# Patient Record
Sex: FEMALE | Race: BLACK OR AFRICAN AMERICAN | Employment: UNEMPLOYED | URBAN - METROPOLITAN AREA
[De-identification: names, ages, dates, MRNs, and addresses within clinical notes are randomized per-mention and may not be internally consistent; named-entity substitution may affect disease eponyms.]

---

## 2018-02-01 ENCOUNTER — OFFICE VISIT (OUTPATIENT)
Dept: OBGYN | Facility: CLINIC | Age: 38
End: 2018-02-01
Payer: COMMERCIAL

## 2018-02-01 VITALS — TEMPERATURE: 96.8 F | SYSTOLIC BLOOD PRESSURE: 110 MMHG | DIASTOLIC BLOOD PRESSURE: 78 MMHG | WEIGHT: 203 LBS

## 2018-02-01 DIAGNOSIS — R10.13 ABDOMINAL PAIN, EPIGASTRIC: Primary | ICD-10-CM

## 2018-02-01 DIAGNOSIS — K59.09 OTHER CONSTIPATION: ICD-10-CM

## 2018-02-01 DIAGNOSIS — Z30.011 ENCOUNTER FOR INITIAL PRESCRIPTION OF CONTRACEPTIVE PILLS: ICD-10-CM

## 2018-02-01 PROCEDURE — 99203 OFFICE O/P NEW LOW 30 MIN: CPT | Performed by: OBSTETRICS & GYNECOLOGY

## 2018-02-01 RX ORDER — ACETAMINOPHEN AND CODEINE PHOSPHATE 120; 12 MG/5ML; MG/5ML
1 SOLUTION ORAL DAILY
Qty: 112 TABLET | Refills: 3 | Status: SHIPPED | OUTPATIENT
Start: 2018-02-01

## 2018-02-01 RX ORDER — ACETAMINOPHEN 500 MG
1000 TABLET ORAL EVERY 8 HOURS PRN
Qty: 100 TABLET | Refills: 1 | Status: SHIPPED | OUTPATIENT
Start: 2018-02-01

## 2018-02-01 RX ORDER — POLYETHYLENE GLYCOL 3350 17 G/17G
1 POWDER, FOR SOLUTION ORAL 2 TIMES DAILY
Qty: 510 G | Refills: 3 | Status: SHIPPED | OUTPATIENT
Start: 2018-02-01

## 2018-02-01 NOTE — PROGRESS NOTES
GYN Clinic Visit    Date of visit: 2018     Chief Complaint: abdominal pain    HPI:   Lolly Jaeger is a 38 year old  for evaluation of abdominal pain. She complains of abdominal pain of 1 week duration.    Location: umbilical, mid abdomen, feels like there might be a bulge to the right of her umbilicus  Quality: sharp and stabbing  Severity:  8/10  Duration: 1 week  Timing: started  at 11am  Context: vaginal bleeding started  at 5pm. Was given norethindrone 10mg to take daily on . Bleeding stopped 2 days ago () but still having pain.  Associated signs/symptoms: no n/v but patient is having constipation for a long. Has bowel movement 1-2 times per week. Has rx for stool softener, takes it 2 times daily but does not seem to help.   Modifying factors: worse with coughing, needs to hold abdomen with coughing    Went to ED at [facility]  with following work up:  18 LIPASE 25.3   18 TSH 0.90   18 PREGURINE Negative     18 Urinalysis  COLOR Pink A   CLARITY Cloudy A   SPECGRAV 1.010   PHURINE 6.0   UROBILINOGEN Normal   PROTEINUA Trace A   GLUCOSEUA Negative   KETONESUA Negative   BLOODUA Moderate A   NITRITE Negative   LEUKOCYTE Small A     MICRO:   RBCUA >100 A   WBCUA 11-25 A   BACTERIAUA Few   EPITHELIALUA Few     CT Abdomen with IV Contrast:   INDICATION: Epigastric pain, vaginal bleeding.  TECHNIQUE: CT Abdomen and pelvis with i.v. contrast. Coronal and sagittal reformats were obtained.  CONTRAST: 100 mL Omnipaque 350  COMPARISON: None  FINDINGS:   Lower chest: Unremarkable.   Liver: Unremarkable.   Spleen: Unremarkable.   Pancreas: Unremarkable.   Gallbladder: Unremarkable.    Kidney: Unremarkable. No kidney or ureteral stones or obstruction seen.   Adrenal: Unremarkable.   Bowel: There are innumerable small nodular densities seen within the lumen of the stomach as well as the proximal small bowel, likely due to recent ingested meal. The appendix is  not identified. Eventration of the midline abdominal wall is present with diastases of the rectus abdominus muscles with a tiny fat containing ventral hernia.     Vascular: Unremarkable.    Lymph: Unremarkable.    Peritoneum: Unremarkable. No pneumoperitoneum is seen. No significant ascites is noted.    Pelvis: Unremarkable.   Soft tissue: Unremarkable. Diffuse fatty atrophy of the left pelvic girdle muscles are noted.    Bone: Unremarkable for age.       IMPRESSION:   1. Unremarkable with no CT correlate for the patient`s symptoms seen.    Addendum by Richy Keenan MD on 2018  3:51 AM  INDICATION: Pelvic pain  TECHNIQUE: Ultrasound pelvis transabdominal and transvaginal. Endovaginal   imaging was performed to better visualize the endometrium and ovaries.   Real-time gray scale sonographic images with spectral and color Doppler   imaging of the ovaries were obtained.    COMPARISON: None    FINDINGS:   Uterus: 10 x 3.2 x 3.7 cm. Normal echotexture of the myometrium noted with   no masses are seen.     Endometrium: 3 mm. No sign of endometrial mass or fluid present.     Right ovary: 1.8 x 1.6 x 1 cm. No Doppler images of the right ovary were   submitted.     Left ovary: 2.5 x 0.8 x 1.7 cm. No Doppler images of the left ovary were   submitted.     Cul-de-sac: No significant ascites noted.     IMPRESSION:   1. Unremarkable pelvic ultrasound. Doppler imaging the ovaries were not   performed but the ovaries are normal in size and appearance on   transabdominal imaging.  Dictated by: ,MD @ 2018 03:49:54    Obstetric History:   Obstetric History     No data available        Obstetric history significant for:   CS x1   x1  Both girls  - one at age 3 (parapalegic[place], one at 1month of age of diarrhea in [place]    Gynecologic History:  Menses: 1-2 times per month - 4 days in duration.   Patient's last menstrual period was 2018.  STI history: none  Last Pap: does  not know  History of abnormal pap: does not know  History of cervical procedures: no   Contraceptive History: none  The patient is sexually active with      Past Medical History:  Past Medical History:   Diagnosis Date     Pain in left foot 7/2014 7/19/2014 cuneiform fracture     Polio 1982    left leg weakness       Past Surgical History:  Past Surgical History:   Procedure Laterality Date     NO HISTORY OF SURGERY         Medications:  Current Outpatient Prescriptions   Medication     diclofenac (VOLTAREN) 1 % GEL     FLUOXETINE HCL PO     LEVOTHYROXINE SODIUM PO     Cholecalciferol (VITAMIN D) 1000 UNITS capsule     OMEPRAZOLE PO     DICLOFENAC SODIUM PO     TRAZODONE HCL PO     acetaminophen (TYLENOL) 325 MG tablet     No current facility-administered medications for this visit.        Allergy:  No Known Allergies  Patient denies food, latex or environmental allergies.     Social History:  Lives with . Feels safe.   Social History   Substance Use Topics     Smoking status: Never Smoker     Smokeless tobacco: Never Used     Alcohol use No       No family history on file.    Review of Systems:  Skin: negative  Eyes: negative  Ears/Nose/Throat: negative  Respiratory: No shortness of breath, dyspnea on exertion, cough, or hemoptysis  Cardiovascular: negative  Gastrointestinal: positive for constipation and abdominal pain. No nausea, no vomiting  Genitourinary: recent episode of heavy bleeding  Musculoskeletal: h/o polio  Neurologic: negative  Psychiatric: negative  Hematologic/Lymphatic/Immunologic: negative  Endocrine: negative    Physical Exam:  Vitals:    02/01/18 0936   BP: 110/78   Temp: 96.8  F (36  C)   TempSrc: Oral   Weight: 203 lb (92.1 kg)     Gen: healthy, alert, active, no distress  Abd: soft, non-tender, non-distended, obese, no masses. Only limited palpation of area of tenderness to the right of umbilicus due to patient discomfort. Voluntary guarding. When asked to cough, patient  clutches her right mid abdomen. Well healed vertical midline scar.  Extremities: nontender, no edema  : declined exam    Assessment:  Lolly Jaeger is a 38 year old  who presents in consultation for abdominal pain and abnormal uterine bleeding.   Abdominal pain does not seem to be gynecologic in origin. Discussed that constipation may exacerbate pain but that has been a longstanding issue and onset of pain was acute 1 week ago. Discussed possible ventral hernia but patient only allowed limited exam. Recommend following up with family practice, consider gen surg consult for possible hernia. Recommend return to ED if severe pain, nausea/vomiting, fever.     In regard to her episode of heavy bleeding, it has resolved with oral progesterone. She is interested in contraception. Due to her age and obesity, recommend progesterone only method. Rx for micronor written, instructed to start it when other norethindrone rx runs out.     Plan:  1. Abdominal pain, epigastric  Discussed that she may have a hernia based on history and outside CT scan. She is difficult to examine and will not allow me to palpate the area of tenderness. She holds it tightly when coughing.    - GENERAL SURG ADULT REFERRAL  - acetaminophen (TYLENOL) 500 MG tablet; Take 2 tablets (1,000 mg) by mouth every 8 hours as needed for mild pain  Dispense: 100 tablet; Refill: 1    2. Encounter for initial prescription of contraceptive pills  - norethindrone (MICRONOR) 0.35 MG per tablet; Take 1 tablet (0.35 mg) by mouth daily  Dispense: 112 tablet; Refill: 3    3. Other constipation  - polyethylene glycol (MIRALAX) powder; Take 17 g (1 capful) by mouth 2 times daily  Dispense: 510 g; Refill: 3    Recommend patient return to clinic for pelvic exam and pap smear. She declines exam today.    Renetta Arteaga MD

## 2018-02-01 NOTE — MR AVS SNAPSHOT
After Visit Summary   2/1/2018    Lolly Jaeger    MRN: [id number]           Patient Information     Date Of Birth          1980        Visit Information        Provider Department      2/1/2018 9:15 AM Renetta Arteaga MD; [facility] [employer] [facility] [place]        Today's Diagnoses     Abdominal pain, epigastric    -  1    Encounter for initial prescription of contraceptive pills           Follow-ups after your visit        Additional Services     GENERAL SURG ADULT REFERRAL       Possible ventral hernia  Paraumbilical pain    Your provider has referred you to: [facility]: [facility] [phone]   [email or web address]    Please be aware that coverage of these services is subject to the terms and limitations of your health insurance plan.  Call member services at your health plan with any benefit or coverage questions.      Please bring the following with you to your appointment:    (1) Any X-Rays, CTs or MRIs which have been performed.  Contact the facility where they were done to arrange for  prior to your scheduled appointment.   (2) List of current medications   (3) This referral request   (4) Any documents/labs given to you for this referral                  Who to contact     If you have questions or need follow up information about today's clinic visit or your schedule please contact [facility] directly at [phone].  Normal or non-critical lab and imaging results will be communicated to you by MyChart, letter or phone within 4 business days after the clinic has received the results. If you do not hear from us within 7 days, please contact the clinic through MyChart or phone. If you have a critical or abnormal lab result, we will notify you by phone as soon as possible.  Submit refill requests through [employer] or call your pharmacy and they will forward the refill request to  "us. Please allow 3 business days for your refill to be completed.          Additional Information About Your Visit        [employer] Information     [employer] lets you send messages to your doctor, view your test results, renew your prescriptions, schedule appointments and more. To sign up, go to [email or web address] . Click on \"Log in\" on the left side of the screen, which will take you to the Welcome page. Then click on \"Sign up Now\" on the right side of the page.     You will be asked to enter the access code listed below, as well as some personal information. Please follow the directions to create your username and password.     Your access code is: [email or web address]  Expires: 2018 10:14 AM     Your access code will  in 90 days. If you need help or a new code, please call your [facility] clinic or [phone].        Care EveryWhere ID     This is your Care EveryWhere ID. This could be used by other organizations to access your [facility] medical records  [id number]        Your Vitals Were     Temperature Last Period                96.8  F (36  C) (Oral) 2018           Blood Pressure from Last 3 Encounters:   18 110/78   14 100/70    Weight from Last 3 Encounters:   18 203 lb (92.1 kg)   07/17/15 214 lb (97.1 kg)   14 186 lb (84.4 kg)              We Performed the Following     GENERAL SURG ADULT REFERRAL          Today's Medication Changes          These changes are accurate as of 18 10:14 AM.  If you have any questions, ask your nurse or doctor.               Start taking these medicines.        Dose/Directions    norethindrone 0.35 MG per tablet   Commonly known as:  MICRONOR   Used for:  Encounter for initial prescription of contraceptive pills   Started by:  Renetta Arteaga MD        Dose:  1 tablet   Take 1 tablet (0.35 mg) by mouth daily   Quantity:  112 tablet   Refills:  3            Where to get your medicines      These medications were sent to " [id number] - [place], [place] - [address], [facility] [address]     Phone:  [phone]     norethindrone 0.35 MG per tablet                Primary Care Provider Office Phone #    [facility] [phone] [address] [address]        Equal Access to Services     SHAUNNA ARREDONDO : Hadii aad ku hadasho Soomaali, waaxda luqadaha, qaybta kaalmada adeegyada, waxay idiin hayaan adeeg kingsleymaevevanessa lacrystal . So [facility] [phone].    ATENCIÓN: Si habla español, tiene a contreras disposición servicios gratuitos de asistencia lingüística. Latonya al [phone].    We comply with applicable federal civil rights laws and [place] laws. We do not discriminate on the basis of race, color, national origin, age, disability, sex, sexual orientation, or gender identity.            Thank you!     Thank you for choosing [facility]  for your care. Our goal is always to provide you with excellent care. Hearing back from our patients is one way we can continue to improve our services. Please take a few minutes to complete the written survey that you may receive in the mail after your visit with us. Thank you!             Your Updated Medication List - Protect others around you: Learn how to safely use, store and throw away your medicines at www.disposemymeds.org.          This list is accurate as of 2/1/18 10:14 AM.  Always use your most recent med list.                   Brand Name Dispense Instructions for use Diagnosis    acetaminophen 325 MG tablet    TYLENOL    100 tablet    Take 2 tablets (650 mg) by mouth every 4 hours as needed for mild pain        diclofenac 1 % Gel topical gel    VOLTAREN     Place onto the skin 4 times daily        DICLOFENAC SODIUM PO           FLUOXETINE HCL PO           LEVOTHYROXINE SODIUM PO           norethindrone 0.35 MG per tablet    MICRONOR    112 tablet    Take 1 tablet (0.35 mg) by mouth daily    Encounter for  initial prescription of contraceptive pills       OMEPRAZOLE PO           TRAZODONE HCL PO           vitamin D 1000 UNITS capsule      Take 1 capsule by mouth daily

## 2018-02-01 NOTE — NURSING NOTE
Chief Complaint   Patient presents with     Consult       Initial /78  Temp 96.8  F (36  C) (Oral)  Wt 203 lb (92.1 kg)  LMP 01/27/2018 There is no height or weight on file to calculate BMI.  BP completed using cuff size: large    No obstetric history on file.    The following HM Due: NONE      The following patient reported/Care Every where data was sent to:  P ABSTRACT QUALITY INITIATIVES [42630]        N/a      Evi Sarah MA

## 2019-06-13 ENCOUNTER — OFFICE VISIT (OUTPATIENT)
Dept: NEUROLOGY | Facility: CLINIC | Age: 39
End: 2019-06-13
Payer: COMMERCIAL

## 2019-06-13 VITALS
SYSTOLIC BLOOD PRESSURE: 114 MMHG | WEIGHT: 180.1 LBS | DIASTOLIC BLOOD PRESSURE: 79 MMHG | OXYGEN SATURATION: 100 % | HEART RATE: 82 BPM

## 2019-06-13 DIAGNOSIS — R20.2 PARESTHESIA: Primary | ICD-10-CM

## 2019-06-13 DIAGNOSIS — R20.2 PARESTHESIA: ICD-10-CM

## 2019-06-13 DIAGNOSIS — Z86.12 HISTORY OF POST-POLIO SYNDROME: ICD-10-CM

## 2019-06-13 LAB
HBA1C MFR BLD: 5.8 % (ref 0–5.6)
VIT B12 SERPL-MCNC: 642 PG/ML (ref 193–986)

## 2019-06-13 ASSESSMENT — PAIN SCALES - GENERAL: PAINLEVEL: NO PAIN (0)

## 2019-06-13 NOTE — LETTER
Lolly CHAUDHRY Mil  [address]   [place] [address]        6/18/2019      Dear Ms. Jaeger:    Thank you for allowing me to participate in your care. Your recent test results were reviewed and listed below.      Recommended to follow up with your primary care provider for elevated A1c which indicates that your blood sugar was not well controlled.       Your results are provided below for your review       Results for orders placed or performed in visit on 06/13/19   Hemoglobin A1c   Result Value Ref Range    Hemoglobin A1C 5.8 (H) 0 - 5.6 %   Vitamin B12   Result Value Ref Range    Vitamin B12 642 193 - 986 pg/mL          If you have any further questions or concerns, please do not hesitate to contact us.     Sincerely,    AMBER Mustafa [facility]  NEUROLOGY CLINIC  Phone: [phone]

## 2019-06-13 NOTE — PROGRESS NOTES
"Re: Lolly Jaeger      MRN# [id number]  YOB: 1980  Date of Visit:6/13/2019    OUTPATIENT NEUROLOGY VISIT NOTE    Chief Complaint:   paresthesia in hands and feet    History of Present Illness  Lolly Jaeger is a 39-year-old right-handed  presents to the clinic today for paresthesia in hands and feet  Accompanied to today's appointment by a [facility] .   Reports history poliomyelitis when she was 1 years old and since then she has been experiencing weakness in the left leg.     New symptoms- Right shoulder sharp pain and \"needle like pain\" in both hands and in both feet. Onset 5 months ago. Symptoms in her hands and feet are worse at night, especially right arm pain.   Reports that left leg feels a \"little bit\" weak. Reports that she fells because of left leg weakness because of \"polio\". Patient uses a walker with walking.   Reports that she has been fallen but denies injury to head or neck. Reports that her last fall was 5 months ago and injured her knee and fell on her hands. Patient points that the pain is predominately in the right deltoid area.   Ortho visit on 9/26/2018 for knee pain and steroid injection and right shoulder pain due to injury on 8/22/2018 (at the same time with her right knee) fellon the right shoulder and knee. Limited ROM in the right shoulder and symptoms were consistent with bursitis and received injections but did not do PT. Patient reports that pain in her right shoulder was relieved for 3-months after the injections. Patient did not return back to her orthopedist.     Denies history of head or neck trauma, loss of consciousness, seizure, double vision, blurred vision, hearing difficulty, speech or swallowing difficulty, weakness or numbness in face, arms or legs, urinary or bowel incontinence, coordination problems or gait difficulty, fever or chills.    Neurodiagnostic Testing:    MRI reviewed  IMPRESSION:    1.  Moderate supraspinatus tendinopathy with " articular and bursal sided fraying. No evidence of a tear.  2.  Moderate infraspinatus tendinopathy without evidence of a tear.  3.  Moderate volume subacromial/deltoid bursal fluid which may represent bursitis.  4.  Mild glenohumeral chondromalacia.  5.  Small glenohumeral joint effusion.  6.  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  Lab  TSH in 2018 0.90  CBC 2018  Normal    Past Medical History reviewed and verified with the patient  Diagnosis Date     Closed multiple fractures of both legs     Falls frequently     Knee pain, right     Late effects of poliomyelitis     Left leg weakness     Shoulder pain, right       Past Medical History:   Diagnosis Date     Pain in left foot 2014 cuneiform fracture     Polio 1982    left leg weakness       Past Surgical History reviewed and verified with the patient  Past Surgical History:   Procedure Laterality Date      SECTION       Family History reviewed and verified with the patient  No neurological problems    Social History:  Reports that she had 2 kids - at the age of 2 months and 5-year old, lives with her    Social History     Tobacco Use     Smoking status: Never Smoker     Smokeless tobacco: Never Used   Substance Use Topics     Alcohol use: No    reviewed and verified with the patient   No Known Allergies    Current Outpatient Medications   Medication Sig Dispense Refill     acetaminophen (TYLENOL) 325 MG tablet Take 2 tablets (650 mg) by mouth every 4 hours as needed for mild pain 100 tablet 0     acetaminophen (TYLENOL) 500 MG tablet Take 2 tablets (1,000 mg) by mouth every 8 hours as needed for mild pain 100 tablet 1     Cholecalciferol (VITAMIN D) 1000 UNITS capsule Take 1 capsule by mouth daily       diclofenac (VOLTAREN) 1 % GEL Place onto the skin 4 times daily       DICLOFENAC SODIUM PO        FLUOXETINE HCL PO        LEVOTHYROXINE SODIUM PO        norethindrone (MICRONOR) 0.35 MG per tablet Take 1 tablet (0.35  mg) by mouth daily 112 tablet 3     OMEPRAZOLE PO        polyethylene glycol (MIRALAX) powder Take 17 g (1 capful) by mouth 2 times daily 510 g 3     TRAZODONE HCL PO      reviewed and verified with the patient    Review of Systems:  A 12-point ROS including constitutional, eyes, ENT, respiratory, cardiovascular, gastroenterology, genitourinary, integumentary, musculoskeletal, neurology, hematology and psychiatric were all reviewed with the patient and completed at the Neuroscience Services Question nary and as mentioned in the HPI.     General Exam:   /79 (BP Location: Left arm, Patient Position: Sitting, Cuff Size: Adult Large)   Pulse 82   Wt 81.7 kg (180 lb 1.6 oz)   SpO2 100%   GEN: Awake, NAD; good eye contact, responses appropriately via   but sits quietly and flat affect  HEENT: Head atraumatic/Normocephalic. Scalp normal. Pupils equally round, 4 mm, reactive to light and accommodation, sclera and conjunctiva normal. Fundoscopic examination reveals normal vessels no papilledema.   Neck: Easily moveable without resistance  Heart: S1/S2 appreciated, RRR, no m/r/g, no carotid bruits  Lungs:Lungs are clear to auscultation bilaterally, no wheezes or crackles.   Neurological Examination:  The patient is alert and oriented times four. Has good attention and concentration.  Speech is fluent without dysarthria.   Cranial nerves:  CN I deferred.   CN II: Intact and full visual fields to confrontation bilaterally. CN III, IV, VI: EOM intact. There is no nystagmus. Has conjugated gaze. Intact direct and consensual pupillary light reflexes.   CN V: Intact and symmetrical to facial sensation in the V1 through V3 bilaterally.   CN VII: Intact and symmetrical eyebrow and lid raise and eyelid closure, smiles and frown.   CN VIII: Intact to finger rub bilaterally.   CN IX and X: The palates elevates symmetrical. The uvula is midline.   CN XII: The tongue protrudes midline with no atrophy or fasciculations.    Motor exam: The patient has a normal bulk and tone throughout, except hypotonia in the left ankle.   There is atrophy left foot post polio  Strength Exam:  4/5 strength left and antigravity right  at shoulder abduction, elbow flexion or extension, wrist flexion or extension, finger abduction,  weaker right than left , antigravity right hip flexion and extension, knee flexion and extension, and dorsiflexion and plantarflexion and left leg weakness post polio.   Sensation is intact to light touch and pinprick throughout. Intact vibration at great toes bilaterally.   Reflexes are 2+ and symmetrical at biceps and brachioradialis and 1/4 triceps right and 1+ left , 1/4 right and not able to illicit left patellar, and 1+ right and not able to illicit left Achilles.   Negative Babinski with downgoing toes bilaterally.   Gait slow uses walker      Assessment and Plan:  New onset of paresthesia in both hands and feet. Weakness in the upper and lower extremities -left leg post polio and UE and right leg -possibly due to pain (right knee pain and right shoulder pain). No sensory deficit today  Patient reports that she falls frequently so its not unreasonable to image her cervical area given weakness and paresthesia.   EMG for paresthesia localization    Plan:  Cervical MRI   EMG   Labs: A1c, B12 and MMA  Follow up after tests    I discussed all my recommendation with Lolly Jaeger. The patient verbalizes understanding and comfortable with the plan. The patient has our clinic phone number to call with any questions or concerns. All of the patient's questions were answered from the best of my current knowledge.     Thank you for letting me be a part of the treatment team for Lolly Jaeger      Time spent with pt answering questions, discussing findings, counseling and coordinating care was more than 50% the appointment time, 56  minutes.         AMBER Mustafa, CNP  [facility]

## 2019-06-13 NOTE — LETTER
"6/13/2019       RE: Lolly aJeger  [address]  [facility] [address]     Dear Colleague,    Thank you for referring your patient, Lolly Jaeger, to the [facility] NEUROLOGY at [facility]. Please see a copy of my visit note below.    Re: Lolly Jaeger      MRN# [id number]  YOB: 1980  Date of Visit:6/13/2019    OUTPATIENT NEUROLOGY VISIT NOTE    Chief Complaint:   paresthesia in hands and feet    History of Present Illness  Lolly Jaeger is a 39-year-old right-handed  presents to the clinic today for paresthesia in hands and feet  Accompanied to today's appointment by a [facility] .   Reports history poliomyelitis when she was 1 years old and since then she has been experiencing weakness in the left leg.     New symptoms- Right shoulder sharp pain and \"needle like pain\" in both hands and in both feet. Onset 5 months ago. Symptoms in her hands and feet are worse at night, especially right arm pain.   Reports that left leg feels a \"little bit\" weak. Reports that she fells because of left leg weakness because of \"polio\". Patient uses a walker with walking.   Reports that she has been fallen but denies injury to head or neck. Reports that her last fall was 5 months ago and injured her knee and fell on her hands. Patient points that the pain is predominately in the right deltoid area.   Ortho visit on 9/26/2018 for knee pain and steroid injection and right shoulder pain due to injury on 8/22/2018 (at the same time with her right knee) fellon the right shoulder and knee. Limited ROM in the right shoulder and symptoms were consistent with bursitis and received injections but did not do PT. Patient reports that pain in her right shoulder was relieved for 3-months after the injections. Patient did not return back to her orthopedist.     Denies history of head or neck trauma, loss of consciousness, seizure, double vision, blurred " vision, hearing difficulty, speech or swallowing difficulty, weakness or numbness in face, arms or legs, urinary or bowel incontinence, coordination problems or gait difficulty, fever or chills.    Neurodiagnostic Testing:    MRI reviewed  IMPRESSION:    1.  Moderate supraspinatus tendinopathy with articular and bursal sided fraying. No evidence of a tear.  2.  Moderate infraspinatus tendinopathy without evidence of a tear.  3.  Moderate volume subacromial/deltoid bursal fluid which may represent bursitis.  4.  Mild glenohumeral chondromalacia.  5.  Small glenohumeral joint effusion.  6.  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  Lab  TSH in 2018 0.90  CBC 2018  Normal    Past Medical History reviewed and verified with the patient  Diagnosis Date     Closed multiple fractures of both legs     Falls frequently     Knee pain, right     Late effects of poliomyelitis     Left leg weakness     Shoulder pain, right       Past Medical History:   Diagnosis Date     Pain in left foot 2014 cuneiform fracture     Polio 1982    left leg weakness       Past Surgical History reviewed and verified with the patient  Past Surgical History:   Procedure Laterality Date      SECTION       Family History reviewed and verified with the patient  No neurological problems    Social History:  Reports that she had 2 kids - at the age of 2 months and 5-year old, lives with her    Social History     Tobacco Use     Smoking status: Never Smoker     Smokeless tobacco: Never Used   Substance Use Topics     Alcohol use: No    reviewed and verified with the patient   No Known Allergies    Current Outpatient Medications   Medication Sig Dispense Refill     acetaminophen (TYLENOL) 325 MG tablet Take 2 tablets (650 mg) by mouth every 4 hours as needed for mild pain 100 tablet 0     acetaminophen (TYLENOL) 500 MG tablet Take 2 tablets (1,000 mg) by mouth every 8 hours as needed for mild pain 100 tablet 1      Cholecalciferol (VITAMIN D) 1000 UNITS capsule Take 1 capsule by mouth daily       diclofenac (VOLTAREN) 1 % GEL Place onto the skin 4 times daily       DICLOFENAC SODIUM PO        FLUOXETINE HCL PO        LEVOTHYROXINE SODIUM PO        norethindrone (MICRONOR) 0.35 MG per tablet Take 1 tablet (0.35 mg) by mouth daily 112 tablet 3     OMEPRAZOLE PO        polyethylene glycol (MIRALAX) powder Take 17 g (1 capful) by mouth 2 times daily 510 g 3     TRAZODONE HCL PO      reviewed and verified with the patient    Review of Systems:  A 12-point ROS including constitutional, eyes, ENT, respiratory, cardiovascular, gastroenterology, genitourinary, integumentary, musculoskeletal, neurology, hematology and psychiatric were all reviewed with the patient and completed at the Neuroscience Services Question meche and as mentioned in the HPI.     General Exam:   /79 (BP Location: Left arm, Patient Position: Sitting, Cuff Size: Adult Large)   Pulse 82   Wt 81.7 kg (180 lb 1.6 oz)   SpO2 100%   GEN: Awake, NAD; good eye contact, responses appropriately via   but sits quietly and flat affect  HEENT: Head atraumatic/Normocephalic. Scalp normal. Pupils equally round, 4 mm, reactive to light and accommodation, sclera and conjunctiva normal. Fundoscopic examination reveals normal vessels no papilledema.   Neck: Easily moveable without resistance  Heart: S1/S2 appreciated, RRR, no m/r/g, no carotid bruits  Lungs:Lungs are clear to auscultation bilaterally, no wheezes or crackles.   Neurological Examination:  The patient is alert and oriented times four. Has good attention and concentration.  Speech is fluent without dysarthria.   Cranial nerves:  CN I deferred.   CN II: Intact and full visual fields to confrontation bilaterally. CN III, IV, VI: EOM intact. There is no nystagmus. Has conjugated gaze. Intact direct and consensual pupillary light reflexes.   CN V: Intact and symmetrical to facial sensation in the V1  through V3 bilaterally.   CN VII: Intact and symmetrical eyebrow and lid raise and eyelid closure, smiles and frown.   CN VIII: Intact to finger rub bilaterally.   CN IX and X: The palates elevates symmetrical. The uvula is midline.   CN XII: The tongue protrudes midline with no atrophy or fasciculations.   Motor exam: The patient has a normal bulk and tone throughout, except hypotonia in the left ankle.   There is atrophy left foot post polio  Strength Exam:  4/5 strength left and antigravity right  at shoulder abduction, elbow flexion or extension, wrist flexion or extension, finger abduction,  weaker right than left , antigravity right hip flexion and extension, knee flexion and extension, and dorsiflexion and plantarflexion and left leg weakness post polio.   Sensation is intact to light touch and pinprick throughout. Intact vibration at great toes bilaterally.   Reflexes are 2+ and symmetrical at biceps and brachioradialis and 1/4 triceps right and 1+ left , 1/4 right and not able to illicit left patellar, and 1+ right and not able to illicit left Achilles.   Negative Babinski with downgoing toes bilaterally.   Gait slow uses walker    Assessment and Plan:  New onset of paresthesia in both hands and feet. Weakness in the upper and lower extremities -left leg post polio and UE and right leg -possibly due to pain (right knee pain and right shoulder pain). No sensory deficit today  Patient reports that she falls frequently so its not unreasonable to image her cervical area given weakness and paresthesia.   EMG for paresthesia localization    Plan:  Cervical MRI   EMG   Labs: A1c, B12 and MMA  Follow up after tests    I discussed all my recommendation with Lolly Jaeger. The patient verbalizes understanding and comfortable with the plan. The patient has our clinic phone number to call with any questions or concerns. All of the patient's questions were answered from the best of my current knowledge.      Thank you for letting me be a part of the treatment team for Lolly ISIDORO Jaeger    Time spent with pt answering questions, discussing findings, counseling and coordinating care was more than 50% the appointment time, 56  minutes.     AMBER Mustafa, [facility]

## 2019-06-13 NOTE — NURSING NOTE
Chief Complaint   Patient presents with     Consult     [facility] NEW - PARESTHESIA IN BOTH HANDS AND PAIN IN BOTH FEET       Rolly Nguyen, EMT

## 2019-06-14 LAB — METHYLMALONATE SERPL-SCNC: NORMAL UMOL/L (ref 0–0.4)

## 2019-06-16 LAB
RESULT: NORMAL
SEND OUTS MISC TEST CODE: NORMAL
SEND OUTS MISC TEST SPECIMEN: NORMAL
TEST NAME: NORMAL

## 2019-06-18 NOTE — RESULT ENCOUNTER NOTE
Can you please call the patient with the results and ask her to follow up with her PCP for diabetes evaluation. Please fax it to her PCP. I sent a letter to the patient already.

## 2019-06-19 ENCOUNTER — TELEPHONE (OUTPATIENT)
Dept: NEUROLOGY | Facility: CLINIC | Age: 39
End: 2019-06-19

## 2019-06-19 NOTE — TELEPHONE ENCOUNTER
----- Message from AMBER Velasquez CNP sent at 6/18/2019  4:30 PM CDT -----  Can you please call the patient with the results and ask her to follow up with her PCP for diabetes evaluation. Please fax it to her PCP. I sent a letter to the patient already.

## 2019-07-18 ENCOUNTER — OFFICE VISIT (OUTPATIENT)
Dept: NEUROLOGY | Facility: CLINIC | Age: 39
End: 2019-07-18
Attending: NURSE PRACTITIONER
Payer: COMMERCIAL

## 2019-07-18 DIAGNOSIS — M54.16 RIGHT LUMBAR RADICULOPATHY: ICD-10-CM

## 2019-07-18 DIAGNOSIS — R20.2 PARESTHESIA: ICD-10-CM

## 2019-07-18 DIAGNOSIS — G56.03 BILATERAL CARPAL TUNNEL SYNDROME: Primary | ICD-10-CM

## 2019-07-18 DIAGNOSIS — M54.12 RIGHT CERVICAL RADICULOPATHY: ICD-10-CM

## 2019-07-18 NOTE — LETTER
7/18/2019       RE: Lolly Jaeger  [address]  [facility] [address]     Dear Colleague,    Thank you for referring your patient, Lolly Jaeger, to the [facility] EMG at [facility]. Please see a copy of my visit note below.      [facility]  Electrodiagnostic Laboratory    Nerve Conduction & EMG Report          Patient: Lolly Jaeger  YOB: 1980  Patient ID: [id number]  Age: 39 Years 6 Months  Gender: Female    Referring Provider: Akilah Gustafson NP    History & Examination:    39 year old woman with remote history of polio at age 2 causing left leg weakness, who reports paresthesias at both hands, sharp pain at the right shoulder and proximal arm, and pain at both feet. Query: entrapment neuropathies of upper extremities, bilateral ervical radiculopathies, lumbar radiculopathies, polyneuropathy.     Techniques:    Sensory and motor conduction studies were done with surface recording electrodes. EMG was done with a concentric needle electrode.     Results:    Bilateral median antidromic sensory NCSs showed attenuated conduction velocities, left>right, and normal SNAP amplitudes. Bilateral median orthodromic mixed NCSs done with stimulation at the palm showed prolonged peak latencies bilaterally. Bilateral ulnar orthodromic mixed NCSs, bilateral ulnar, right sural, and right superficial peroneal antidromic sensory NCSs were all normal. Bilateral median motor NCSs showed mildly prolonged distal latencies (right>left), normal CMAP amplitudes, and normal conduction velocities. Bilateral ulnar, right deep peroneal, and right tibial motor NCSs were all normal.   EMG of the right biceps, triceps, deltoid, tibialis anterior, vastus lateralis, and short head of biceps femoris, showed several large, long duration, stable MUPs, with mildly to moderately reduced recruitment in most muscles, except for the right vastus lateralis where  recruitment was deemed normal. No abnormal spontaneous activity was detected except for 1+ fasciculations at the right C7 paraspinals. EMG of the following muscles was normal: bilateral FDI, bilateral pronator teres, left triceps, biceps, and deltoid, left C7 paraspinals, and right medial gastrocnemius. Needle EMG of the right tensor fascia latae was attempted but the muscle could not be reached due to obesity.     Interpretation:    Abnormal study. There is electrodiagnostic evidence of: 1) Bilateral mild-to-moderate median neuropathies at the wrist, as seen in carpal tunnel syndrome, 2) Right chronic C6 and C7, as well as L4, L5, and S1 radiculopathies or anterior horn disorder at the same levels. There was no electrodiagnostic evidence of: large fiber polyneuropathy, bilateral ulnar neuropathies, or left cervical radiculopathy. Clinical correlation is recommended.     EMG Physician:    Qamar Martinez MD      Sensory NCS      Nerve / Sites Rec. Site Onset Peak Ref. NP Amp Ref. PP Amp Dist Elton Ref. Temp     ms ms ms  V  V  V cm m/s m/s  C   R MEDIAN - Dig II Anti      Wrist Dig II 3.07 4.22  25.2 10.0 43.4 14 45.6 48.0 32.3   L MEDIAN - Dig II Anti      Wrist Dig II 3.54 4.43  20.3 10.0 35.6 14 39.5 48.0 32.2   R ULNAR - Dig V Anti      Wrist Dig V 2.08 2.81  47.7 8.0 77.6 12.5 60.0 48.0 32.2   L ULNAR - Dig V Anti      Wrist Dig V 1.88 3.39  54.2 8.0 90.0 12.5 66.7 48.0 32   R SURAL - Lat Mall      Calf Ankle 2.55 3.23  18.6 8.0 21.4 14 54.9 38.0 31   R SUP PERONEAL      Lat Leg Whyte 2.29 2.97  11.4  16.5 12.5 54.5 38.0 31   R MEDIAN - Ulnar - Palmar      Median Wrist 2.40 2.92 2.40 6.1  18.5 8 33.4  32.2      Ulnar Wrist 1.35 1.88 2.40 9.5  12.9 8 59.1  32.2   L MEDIAN - Ulnar - Palmar      Median Wrist 2.19 2.81 2.40 24.4  22.6 8 36.6  32.5      Ulnar Wrist 1.30 1.93 2.40 18.6  26.7 8 61.4  32.5       Motor NCS      Nerve / Sites Rec. Site Lat Ref. Amp Ref. Rel Amp Dist Elton Ref. Dur. Area Temp.     ms ms  mV mV % cm m/s m/s ms %  C   R MEDIAN - APB      Wrist APB 4.74 4.40 7.9 5.0 100 8   7.66 100 32.3      Elbow APB 7.81  7.9  99.9 20 65.1 48.0 8.18 89.5 32.3      Elbow ADM (MG Check) 6.77  1.7  21.3    4.48 12.2 32.3   L MEDIAN - APB      Wrist APB 4.48 4.40 9.7 5.0 100 8   7.55 100 32.3      Elbow APB 7.81  11.0  113 26 78.0 48.0 7.66 100 32.3      Axilla APB 6.82  1.0  9.91    6.51 7.91 23.3   R ULNAR - ADM      Wrist ADM 2.76 3.50 12.1 5.0 100 8   5.99 100 32.2      B.Elbow ADM 5.83  9.2  76 18 58.6 48.0 6.51 80.7 32.2      A.Elbow ADM 7.34  9.1  75 10 66.2 48.0 6.35 77.9 32.2   L ULNAR - ADM      Wrist ADM 3.28 3.50 12.0 5.0 100 8   7.03 100 23.4      B.Elbow ADM 6.41  10.0  83.6 18.5 59.2 48.0 7.19 89.5 23.6      A.Elbow ADM 7.60  9.2  76.9 8 66.8 48.0 7.40 81 23.7   R DEEP PERONEAL - EDB      Ankle EDB 3.13 6.00 7.5 2.5 100 8   4.27 100 31      FibHead EDB 8.28  6.1  81.4 28 54.3 38.0 4.69 81 31      Pop Fos EDB 9.64  6.4  85.3 10 73.8 38.0 4.48 77.6 31   R TIBIAL - AH      Ankle AH 2.97 6.00 8.4 4.0 100 8   5.21 100 31      Pop Fos AH 9.11  4.1  48.8 37 60.2 38.0 6.93 77.7 31       EMG Summary Table     Spontaneous MUAP Recruitment    IA Fib/PSW Fasc H.F. Amp Dur. PPP Pattern   L. FIRST D INTEROSS N None None None N N None Normal   L. TRICEPS N None None None N N None Normal   L. PRON TERES N None None None N N None Normal   L. BICEPS N None None None N N None Normal   L. DELTOID N None None None N N None Normal   R. FIRST D INTEROSS N None None None N N None Normal   R. TRICEPS N None None None 1+ 1+ None Mild Reduced   R. DELTOID N None None None 2+ 2+ None Moderate Reduced   R. BICEPS N None None None 2+ 2+ None Mild Reduced   R. PRON TERES N None None None N N None Normal   R. TIB ANTERIOR N None None None 2+ 3+ None Moderate Reduced   R. GASTROCN (MED) Increased None None None N N None Normal   R. VAST LATERALIS N None None None 2+ 2+ None Normal   R. T FASCIA ISABELLA Could not reach muscle          R. BIC  FEM (S HEAD) N None None None 2+ 2+ None Moderate Reduced   R. C6 PSP N None 1+ None N N None Normal   L. C7 PSP N None None None N N None Normal

## 2019-07-18 NOTE — PROGRESS NOTES
[facility]  Electrodiagnostic Laboratory    Nerve Conduction & EMG Report          Patient: Lolly Jaeger  YOB: 1980  Patient ID: [id number]  Age: 39 Years 6 Months  Gender: Female    Referring Provider: Akilah Gustafson NP    History & Examination:    39 year old woman with remote history of polio at age 2 causing left leg weakness, who reports paresthesias at both hands, sharp pain at the right shoulder and proximal arm, and pain at both feet. Query: entrapment neuropathies of upper extremities, bilateral ervical radiculopathies, lumbar radiculopathies, polyneuropathy.     Techniques:    Sensory and motor conduction studies were done with surface recording electrodes. EMG was done with a concentric needle electrode.     Results:    Bilateral median antidromic sensory NCSs showed attenuated conduction velocities, left>right, and normal SNAP amplitudes. Bilateral median orthodromic mixed NCSs done with stimulation at the palm showed prolonged peak latencies bilaterally. Bilateral ulnar orthodromic mixed NCSs, bilateral ulnar, right sural, and right superficial peroneal antidromic sensory NCSs were all normal. Bilateral median motor NCSs showed mildly prolonged distal latencies (right>left), normal CMAP amplitudes, and normal conduction velocities. Bilateral ulnar, right deep peroneal, and right tibial motor NCSs were all normal.   EMG of the right biceps, triceps, deltoid, tibialis anterior, vastus lateralis, and short head of biceps femoris, showed several large, long duration, stable MUPs, with mildly to moderately reduced recruitment in most muscles, except for the right vastus lateralis where recruitment was deemed normal. No abnormal spontaneous activity was detected except for 1+ fasciculations at the right C7 paraspinals. EMG of the following muscles was normal: bilateral FDI, bilateral pronator teres, left triceps, biceps, and deltoid, left C7 paraspinals, and right medial  gastrocnemius. Needle EMG of the right tensor fascia latae was attempted but the muscle could not be reached due to obesity.     Interpretation:    Abnormal study. There is electrodiagnostic evidence of: 1) Bilateral mild-to-moderate median neuropathies at the wrist, as seen in carpal tunnel syndrome, 2) Right chronic C6 and C7, as well as L4, L5, and S1 radiculopathies or anterior horn disorder at the same levels. There was no electrodiagnostic evidence of: large fiber polyneuropathy, bilateral ulnar neuropathies, or left cervical radiculopathy. Clinical correlation is recommended.     EMG Physician:    Qamar Martinez MD      Sensory NCS      Nerve / Sites Rec. Site Onset Peak Ref. NP Amp Ref. PP Amp Dist Elton Ref. Temp     ms ms ms  V  V  V cm m/s m/s  C   R MEDIAN - Dig II Anti      Wrist Dig II 3.07 4.22  25.2 10.0 43.4 14 45.6 48.0 32.3   L MEDIAN - Dig II Anti      Wrist Dig II 3.54 4.43  20.3 10.0 35.6 14 39.5 48.0 32.2   R ULNAR - Dig V Anti      Wrist Dig V 2.08 2.81  47.7 8.0 77.6 12.5 60.0 48.0 32.2   L ULNAR - Dig V Anti      Wrist Dig V 1.88 3.39  54.2 8.0 90.0 12.5 66.7 48.0 32   R SURAL - Lat Mall      Calf Ankle 2.55 3.23  18.6 8.0 21.4 14 54.9 38.0 31   R SUP PERONEAL      Lat Leg Whyte 2.29 2.97  11.4  16.5 12.5 54.5 38.0 31   R MEDIAN - Ulnar - Palmar      Median Wrist 2.40 2.92 2.40 6.1  18.5 8 33.4  32.2      Ulnar Wrist 1.35 1.88 2.40 9.5  12.9 8 59.1  32.2   L MEDIAN - Ulnar - Palmar      Median Wrist 2.19 2.81 2.40 24.4  22.6 8 36.6  32.5      Ulnar Wrist 1.30 1.93 2.40 18.6  26.7 8 61.4  32.5       Motor NCS      Nerve / Sites Rec. Site Lat Ref. Amp Ref. Rel Amp Dist Elton Ref. Dur. Area Temp.     ms ms mV mV % cm m/s m/s ms %  C   R MEDIAN - APB      Wrist APB 4.74 4.40 7.9 5.0 100 8   7.66 100 32.3      Elbow APB 7.81  7.9  99.9 20 65.1 48.0 8.18 89.5 32.3      Elbow ADM (MG Check) 6.77  1.7  21.3    4.48 12.2 32.3   L MEDIAN - APB      Wrist APB 4.48 4.40 9.7 5.0 100 8   7.55 100 32.3       Elbow APB 7.81  11.0  113 26 78.0 48.0 7.66 100 32.3      Axilla APB 6.82  1.0  9.91    6.51 7.91 23.3   R ULNAR - ADM      Wrist ADM 2.76 3.50 12.1 5.0 100 8   5.99 100 32.2      B.Elbow ADM 5.83  9.2  76 18 58.6 48.0 6.51 80.7 32.2      A.Elbow ADM 7.34  9.1  75 10 66.2 48.0 6.35 77.9 32.2   L ULNAR - ADM      Wrist ADM 3.28 3.50 12.0 5.0 100 8   7.03 100 23.4      B.Elbow ADM 6.41  10.0  83.6 18.5 59.2 48.0 7.19 89.5 23.6      A.Elbow ADM 7.60  9.2  76.9 8 66.8 48.0 7.40 81 23.7   R DEEP PERONEAL - EDB      Ankle EDB 3.13 6.00 7.5 2.5 100 8   4.27 100 31      FibHead EDB 8.28  6.1  81.4 28 54.3 38.0 4.69 81 31      Pop Fos EDB 9.64  6.4  85.3 10 73.8 38.0 4.48 77.6 31   R TIBIAL - AH      Ankle AH 2.97 6.00 8.4 4.0 100 8   5.21 100 31      Pop Fos AH 9.11  4.1  48.8 37 60.2 38.0 6.93 77.7 31       EMG Summary Table     Spontaneous MUAP Recruitment    IA Fib/PSW Fasc H.F. Amp Dur. PPP Pattern   L. FIRST D INTEROSS N None None None N N None Normal   L. TRICEPS N None None None N N None Normal   L. PRON TERES N None None None N N None Normal   L. BICEPS N None None None N N None Normal   L. DELTOID N None None None N N None Normal   R. FIRST D INTEROSS N None None None N N None Normal   R. TRICEPS N None None None 1+ 1+ None Mild Reduced   R. DELTOID N None None None 2+ 2+ None Moderate Reduced   R. BICEPS N None None None 2+ 2+ None Mild Reduced   R. PRON TERES N None None None N N None Normal   R. TIB ANTERIOR N None None None 2+ 3+ None Moderate Reduced   R. GASTROCN (MED) Increased None None None N N None Normal   R. VAST LATERALIS N None None None 2+ 2+ None Normal   R. T FASCIA ISABELLA Could not reach muscle          R. BIC FEM (S HEAD) N None None None 2+ 2+ None Moderate Reduced   R. C6 PSP N None 1+ None N N None Normal   L. C7 PSP N None None None N N None Normal

## 2019-07-18 NOTE — LETTER
Lolly Jaeger  [address]   [facility] [address]        7/23/2019      Dear Ms. Jaeger:    Thank you for allowing me to participate in your care. Your recent test results were reviewed and listed below.        Please complete cervical MRI and schedule a follow up visit. THANK YOU        Your results are provided below for your review  [facility]  Electrodiagnostic Laboratory    Nerve Conduction & EMG Report     Patient: Lolly Jaeger  YOB: 1980  Patient ID: [id number]  Age: 39 Years 6 Months  Gender: Female    Referring Provider: Akilah Gustafson NP    History & Examination:    39 year old woman with remote history of polio at age 2 causing left leg weakness, who reports paresthesias at both hands, sharp pain at the right shoulder and proximal arm, and pain at both feet. Query: entrapment neuropathies of upper extremities, bilateral ervical radiculopathies, lumbar radiculopathies, polyneuropathy.     Techniques:    Sensory and motor conduction studies were done with surface recording electrodes. EMG was done with a concentric needle electrode.     Results:    Bilateral median antidromic sensory NCSs showed attenuated conduction velocities, left>right, and normal SNAP amplitudes. Bilateral median orthodromic mixed NCSs done with stimulation at the palm showed prolonged peak latencies bilaterally. Bilateral ulnar orthodromic mixed NCSs, bilateral ulnar, right sural, and right superficial peroneal antidromic sensory NCSs were all normal. Bilateral median motor NCSs showed mildly prolonged distal latencies (right>left), normal CMAP amplitudes, and normal conduction velocities. Bilateral ulnar, right deep peroneal, and right tibial motor NCSs were all normal.   EMG of the right biceps, triceps, deltoid, tibialis anterior, vastus lateralis, and short head of biceps femoris, showed several large, long duration, stable MUPs, with mildly to moderately reduced  recruitment in most muscles, except for the right vastus lateralis where recruitment was deemed normal. No abnormal spontaneous activity was detected except for 1+ fasciculations at the right C7 paraspinals. EMG of the following muscles was normal: bilateral FDI, bilateral pronator teres, left triceps, biceps, and deltoid, left C7 paraspinals, and right medial gastrocnemius. Needle EMG of the right tensor fascia latae was attempted but the muscle could not be reached due to obesity.     Interpretation:    Abnormal study. There is electrodiagnostic evidence of: 1) Bilateral mild-to-moderate median neuropathies at the wrist, as seen in carpal tunnel syndrome, 2) Right chronic C6 and C7, as well as L4, L5, and S1 radiculopathies or anterior horn disorder at the same levels. There was no electrodiagnostic evidence of: large fiber polyneuropathy, bilateral ulnar neuropathies, or left cervical radiculopathy. Clinical correlation is recommended.     EMG Physician:    Qamar Martinez MD        If you have any further questions or concerns, please do not hesitate to contact us.     Sincerely,    AMBER Mustafa [facility]  NEUROLOGY CLINIC  Phone: [phone]

## 2019-07-22 ENCOUNTER — OFFICE VISIT (OUTPATIENT)
Dept: NEUROLOGY | Facility: CLINIC | Age: 39
End: 2019-07-22
Payer: COMMERCIAL

## 2019-07-22 ENCOUNTER — APPOINTMENT (OUTPATIENT)
Dept: LAB | Facility: CLINIC | Age: 39
End: 2019-07-22
Payer: COMMERCIAL

## 2019-07-22 VITALS — DIASTOLIC BLOOD PRESSURE: 67 MMHG | OXYGEN SATURATION: 100 % | SYSTOLIC BLOOD PRESSURE: 115 MMHG | HEART RATE: 71 BPM

## 2019-07-22 DIAGNOSIS — R20.2 PARESTHESIA: Primary | ICD-10-CM

## 2019-07-22 DIAGNOSIS — G56.03 BILATERAL CARPAL TUNNEL SYNDROME: ICD-10-CM

## 2019-07-22 LAB — TSH SERPL DL<=0.005 MIU/L-ACNC: 1.26 MU/L (ref 0.4–4)

## 2019-07-22 ASSESSMENT — PAIN SCALES - GENERAL: PAINLEVEL: SEVERE PAIN (6)

## 2019-07-22 NOTE — PROGRESS NOTES
Re: Lolly Jaeger      MRN# [id number]  YOB: 1980  Date of Visit:7/22/2019    OUTPATIENT NEUROLOGY VISIT NOTE    Reason for Visit:  paresthesia and EMG follow up    Interval History:  Lolly Jaeger is a 39-year-old female presents to the clinic today for paresthesia and EMG follow up.  Accompanied by a Kosovan language    Initial Neurology visit for paresthesia on 6/13/2019, see visit note for details.   Most of the symptoms in the right shoulder and pain at night. Numbness and tingling in the hands worse at night and per EMG reports consistent with CTS and radiculopathy. . EMG cannot explain her legs symptoms. Recommended cervical MRI  Patient reports that she did not complete her cervical MRI  Patient reports tingling in her feet and possible small fiber neuropathy. A1C 5.8 and patient is aware of prediabetes and has been followed by her PCP and started metformin.     Plan:  Continue to follow up with PCP for diabetes prevention and management  Hand therapy referral for CTS and will check TSH  Wrist braces at night   Schedule  cervical MRI  Follow up after cervical MRI    Past Medical History reviewed   Social History     Tobacco Use     Smoking status: Never Smoker     Smokeless tobacco: Never Used   Substance Use Topics     Alcohol use: No    reviewed and verified with the patient   No Known Allergies    Current Outpatient Medications   Medication Sig Dispense Refill     acetaminophen (TYLENOL) 325 MG tablet Take 2 tablets (650 mg) by mouth every 4 hours as needed for mild pain 100 tablet 0     acetaminophen (TYLENOL) 500 MG tablet Take 2 tablets (1,000 mg) by mouth every 8 hours as needed for mild pain 100 tablet 1     Cholecalciferol (VITAMIN D) 1000 UNITS capsule Take 1 capsule by mouth daily       diclofenac (VOLTAREN) 1 % GEL Place onto the skin 4 times daily       DICLOFENAC SODIUM PO        FLUOXETINE HCL PO        LEVOTHYROXINE SODIUM PO        norethindrone (MICRONOR)  0.35 MG per tablet Take 1 tablet (0.35 mg) by mouth daily 112 tablet 3     OMEPRAZOLE PO        polyethylene glycol (MIRALAX) powder Take 17 g (1 capful) by mouth 2 times daily 510 g 3     TRAZODONE HCL PO      reviewed and verified with the patient    Review of Systems:   A 10-point ROS including constitutional, eyes, respiratory, cardiovascular, gastroenterology, genitourinary, integumentary, musculoskeletal, neurology and psychiatric were all negative except as mentioned in the interval history.      General Exam:   /67 (Patient Position: Sitting)   Pulse 71   SpO2 100%   GEN: Awake, NAD; good eye contact, responses appropriately    Speech is fluent without dysarthria in Ukrainian. Walks with a walker.       Assessment and Plan:  See Interval History for our discussion and plan    I discussed all my recommendation with Lolly Jaeger. The patient verbalizes understanding and comfortable with the plan. The patient has our clinic phone number to call with any questions or concerns. All of the patient's questions were answered from the best of my current knowledge.   Time spent with pt answering questions, discussing findings, counseling and coordinating care was more than 50% the appointment time, 21  minutes.         AMBER Mustafa, CNP  [facility]

## 2019-07-22 NOTE — PATIENT INSTRUCTIONS
Plan:  Continue to follow up with PCP for diabetes prevention and management  Hand therapy referral for CTS and will check TSH  Wrist braces at night   Schedule  cervical MRI  Follow up after cervical MRI

## 2019-07-23 ENCOUNTER — THERAPY VISIT (OUTPATIENT)
Dept: OCCUPATIONAL THERAPY | Facility: CLINIC | Age: 39
End: 2019-07-23
Attending: NURSE PRACTITIONER
Payer: COMMERCIAL

## 2019-07-23 DIAGNOSIS — G56.03 BILATERAL CARPAL TUNNEL SYNDROME: ICD-10-CM

## 2019-07-23 PROCEDURE — 97110 THERAPEUTIC EXERCISES: CPT | Mod: GO | Performed by: OCCUPATIONAL THERAPIST

## 2019-07-23 PROCEDURE — 97165 OT EVAL LOW COMPLEX 30 MIN: CPT | Mod: GO | Performed by: OCCUPATIONAL THERAPIST

## 2019-07-23 PROCEDURE — 97535 SELF CARE MNGMENT TRAINING: CPT | Mod: GO | Performed by: OCCUPATIONAL THERAPIST

## 2019-07-23 NOTE — PROGRESS NOTES
Hand Therapy Initial Evaluation    Current Date:  7/23/2019    Diagnosis:   Bilateral carpal tunnel syndrome, hand numbness and tingling  DOI:  2/1/19    Referring MD:Qamar Martinez MD       Initial Subjective:  Lolly Jaeger is a 39 year old right hand dominant female.  Patient reports symptoms of numbness and tingling  of the bilateral  wrist and hand  which occurred due to unknown. Notes that this happens when I rest elbow on the table, and at night and it wakes me at night, even when I am using the praying beads.  Since onset symptoms are Gradually getting worse..  Special tests:  EMG and NCV.  Previous treatment: none.    General health as reported by patient is fair.  Pertinent medical history includes:Diabetes, Migraines/Headaches, Numbness/Tingling, Osteoarthritis, Osteoporosis, Sleep Disorder/Apnea, Thyroid Problems, Chest Pain, Cold/Hot Extremity  Red flags:  none  Medical allergies:none.  Surgical history: none.  Medication history: Thyroid, migraine medicine.      Occupational Profile Information:  Current occupation is none  Prior functional level:  independent-all household chores  independent-have help in some areas  Barriers include:requires assistance with dressing, personal hygiene, transfers, mobility, lives with  and no children  Mobility: Ambulates with aid of roller walker  Transportation: public transportation and medical transportation  Leisure activities/hobbies: not listed    Functional Outcome Measure:  See flowsheet      Objective:  Pain Level (Scale 0-10):   7/23/2019   At Rest 5   With Use 6     Pain Description:  Date 7/23/2019   Location wrist and hand   Pain Quality Numb and Tingling   Frequency daily     Pain is worst  nighttime   Exacerbated by  bending the elbow and at night and when praying    Relieved by none   Progression Getting worse     Edema:  NONE  Sensation:  Decreased Median Nerve distribution per pt report, Constantly, While Sleeping and more on the  "right than the left, the left is only at night, the right can be aggravated by laying on that side at night and bending and resting on the right elbow during the day.     Special Tests: MN/UN/RN Wrist & Elbow   + indicates mild pain, ++ indicates moderate pain, +++ indicates severe pain   7/23/2019    +/- for pain: grade of pain 0-10/10 Right Left   Tinels at Carpal Tunnel + +   Tinels at flexor/pronator junction +    Tinels at cubital tunnel +    Tinels at [place] +    Median nerve compression at wrist Carpal Tunnel +    Phalens 60' @ 20\" @40\"   MN: Barnhart test for lumbrical incursion  (fist x 30 secs) nt nt   Elbow Flexion test 60\" ++ ++     Brachioplexopathy Palpation Sites   Pain Report:  - none    + mild    ++ moderate    +++ severe   7/23/2019  Comments:    Right Left Pt is not a good historian   Ord 1:Scalenes      Ord 2: Clavicle & 1st Rib       Ord 3: Pect Minor      Distal Axilla - Medial humerus + -    MN: Elbow -  medial antecubital + -    MN:  Prox to heads of  PronTeres + -    UN: Elbow Cubital tunnel + -    UN: Elbow-Prasad s Ligament at FCR  + -    MN: Carpal Tunnel:  ++ +    UN: Guyon s Canal:  +     RN: Posterior Triangular space      RN: Proximal Humerus @ Humeral groove +     RN -  Elbow Medial to BR +     RN: PIN Site +     RN:DRSN Radial wrist + +          Strength   Not measured this visit    Assessment:  Patient presents with symptoms consistent with diagnosis of carpal tunnel syndrome,  with conservative intervention.     Patient's limitations or Problem List includes:  Pain and Sensory disturbance of the bilateral wrist and hand which interferes with the patient's ability to perform Self Care Tasks (dressing, eating, bathing, hygiene/toileting), Sleep Patterns and Household Chores as compared to previous level of function.    Rehab Potential:  Good - Return to full activity, some limitations    Patient will benefit from skilled Occupational Therapy to increase flexibility and " stability of wrist and decrease pain to return to previous activity level and resume normal daily tasks and to reach their rehab potential.    Barriers to Learning:  Language  Greenlandic Interprete present    Communication Issues:  Patient appears to be able to clearly communicate and understand verbal and written communication and follow directions correctly.  Patient has an  for communication clarity.    Chart Review: Chart Review, Brief history including review of medical and/or therapy records relating to the presenting problem and Simple history review with patient    Identified Performance Deficits: bathing/showering, toileting, dressing, hygiene and grooming, home establishment and management, meal preparation and cleanup and sleep    Assessment of Occupational Performance:  5 or more Performance Deficits    Clinical Decision Making (Complexity): Low complexity    Treatment Explanation:  The following has been discussed with the patient:  RX ordered/plan of care  Anticipated outcomes  Possible risks and side effects    Plan:  Frequency:  1 X week, once daily  Duration:  for 4 weeks    Treatment Plan:    Therapeutic Exercise:  AROM and Tendon Gliding  Neuromuscular re-education:  Nerve Gliding and Sensory re-education  Manual Techniques:  Myofascial release    Discharge Plan:  Achieve all LTG.  Independent in home treatment program.  Reach maximal therapeutic benefit.    Home Exercise Program:  Wear wrist splints OTC at night  Tendon gliding and FDS isolated gliding  Avoid leaning on elbows, pad the elbow    Next Visit:  MFR and Nerve gliding

## 2019-07-23 NOTE — RESULT ENCOUNTER NOTE
Discussed at follow up visit. Cervical MRI ordered and follow up after the MRI. Results sent to patient.

## 2019-07-24 NOTE — LETTER
July 24, 2019    Lolly Jaeger  [address]  [place] [address]      The TSH results are normal.     Results History   TSH with free T4 reflex (Order [id number])   7/22/2019  3:40 PM - Interface, Flexilab Results     Component Value Flag Ref Range Units Status   TSH 1.26   0.40 - 4.00 mU/L Final          7/22/2019  2:54 PM - Interface, Flexilab Results        If you have any further questions feel free to call. Thank you.    Sincerely,  The Neurology Team!

## 2019-07-25 ENCOUNTER — ANCILLARY PROCEDURE (OUTPATIENT)
Dept: MRI IMAGING | Facility: CLINIC | Age: 39
End: 2019-07-25
Attending: NURSE PRACTITIONER
Payer: COMMERCIAL

## 2019-07-25 DIAGNOSIS — R20.2 PARESTHESIA: ICD-10-CM

## 2019-07-25 RX ORDER — GADOBUTROL 604.72 MG/ML
10 INJECTION INTRAVENOUS ONCE
Status: COMPLETED | OUTPATIENT
Start: 2019-07-25 | End: 2019-07-25

## 2019-07-25 RX ADMIN — GADOBUTROL 8 ML: 604.72 INJECTION INTRAVENOUS at 13:54

## 2019-08-02 ENCOUNTER — OFFICE VISIT (OUTPATIENT)
Dept: NEUROLOGY | Facility: CLINIC | Age: 39
End: 2019-08-02
Payer: COMMERCIAL

## 2019-08-02 VITALS
RESPIRATION RATE: 17 BRPM | OXYGEN SATURATION: 99 % | DIASTOLIC BLOOD PRESSURE: 63 MMHG | HEART RATE: 78 BPM | WEIGHT: 180 LBS | SYSTOLIC BLOOD PRESSURE: 114 MMHG

## 2019-08-02 DIAGNOSIS — M54.16 LUMBAR RADICULOPATHY: Primary | ICD-10-CM

## 2019-08-02 ASSESSMENT — PAIN SCALES - GENERAL: PAINLEVEL: NO PAIN (0)

## 2019-08-02 NOTE — PATIENT INSTRUCTIONS
Plan:  Continue with hand therapy for hands/carpal tunnel symptoms  Lumbar MRI for legs symptoms and follow up after the MRI to review.    Follow up with your PCP for pre-diabetes evaluation and management

## 2019-08-02 NOTE — NURSING NOTE
Chief Complaint   Patient presents with     RECHECK     Return follow up after MRI    Medications reviewed and vital signs taken.   Hermila Lieberman CMA

## 2019-08-02 NOTE — PROGRESS NOTES
Re: Lolly Jaeger      MRN# [id number]  YOB: 1980  Date of Visit:8/2/2019     OUTPATIENT NEUROLOGY VISIT NOTE    Reason for Visit:  Cervical MRI results follow up    Interval History:  Lolly Jaeger is a 39-year-old female presents to the clinic today for cervical MRI results follow up.   Initial Neurology visit on 6/13/2019 for feet and hands paresthesia.   Accompanied to today's appointment by a [facility] .   Reports history poliomyelitis when she was 1 years old and since then she has been experiencing weakness in the left leg.   Most of the symptoms in the right shoulder and pain at night. Numbness and tingling in the hands worse at night and per EMG reports consistent with CTS and radiculopathy. . EMG cannot explain her legs symptoms. Recommended cervical MRI which patient completed cervical MRI on 7/25/2019 and no acute findings and reviewed.     Left LE weakness since 1 years old. Reports numbness in the right LE >left LE for about 6 months. Patient reports that she feels some pinching in her legs   Hands better with PT and looks like consistent with CTS.     Cervical MRI reviewed and does not explain patient's symptoms.       Plan discussed with the patient via :  Continue with hand therapy for hands/carpal tunnel symptoms  Lumbar MRI for legs symptoms and follow up after the MRI to review.    Follow up with your PCP for pre-diabetes evaluation and management        EMG Results: reviewed with the patient     Bilateral median antidromic sensory NCSs showed attenuated conduction velocities, left>right, and normal SNAP amplitudes. Bilateral median orthodromic mixed NCSs done with stimulation at the palm showed prolonged peak latencies bilaterally. Bilateral ulnar orthodromic mixed NCSs, bilateral ulnar, right sural, and right superficial peroneal antidromic sensory NCSs were all normal. Bilateral median motor NCSs showed mildly prolonged distal latencies  (right>left), normal CMAP amplitudes, and normal conduction velocities. Bilateral ulnar, right deep peroneal, and right tibial motor NCSs were all normal.   EMG of the right biceps, triceps, deltoid, tibialis anterior, vastus lateralis, and short head of biceps femoris, showed several large, long duration, stable MUPs, with mildly to moderately reduced recruitment in most muscles, except for the right vastus lateralis where recruitment was deemed normal. No abnormal spontaneous activity was detected except for 1+ fasciculations at the right C7 paraspinals. EMG of the following muscles was normal: bilateral FDI, bilateral pronator teres, left triceps, biceps, and deltoid, left C7 paraspinals, and right medial gastrocnemius. Needle EMG of the right tensor fascia latae was attempted but the muscle could not be reached due to obesity.      Interpretation:     Abnormal study. There is electrodiagnostic evidence of: 1) Bilateral mild-to-moderate median neuropathies at the wrist, as seen in carpal tunnel syndrome, 2) Right chronic C6 and C7, as well as L4, L5, and S1 radiculopathies or anterior horn disorder at the same levels. There was no electrodiagnostic evidence of: large fiber polyneuropathy, bilateral ulnar neuropathies, or left cervical radiculopathy. Clinical correlation is recommended.        Results for GLORIA GRANADOS (MRN [id number]) as of 8/2/2019 15:23   Ref. Range 7/22/2019 14:53   TSH Latest Ref Range: 0.40 - 4.00 mU/L 1.26       Results for GLORIA GRANADOS (MRN [id number]) as of 8/2/2019 15:23   Ref. Range 6/13/2019 14:42   Hemoglobin A1C Latest Ref Range: 0 - 5.6 % 5.8 (H)           Past Medical History reviewed   Social History     Tobacco Use     Smoking status: Never Smoker     Smokeless tobacco: Never Used   Substance Use Topics     Alcohol use: No    reviewed and verified with the patient   No Known Allergies    Current Outpatient Medications   Medication Sig Dispense Refill      acetaminophen (TYLENOL) 325 MG tablet Take 2 tablets (650 mg) by mouth every 4 hours as needed for mild pain 100 tablet 0     acetaminophen (TYLENOL) 500 MG tablet Take 2 tablets (1,000 mg) by mouth every 8 hours as needed for mild pain 100 tablet 1     Cholecalciferol (VITAMIN D) 1000 UNITS capsule Take 1 capsule by mouth daily       diclofenac (VOLTAREN) 1 % GEL Place onto the skin 4 times daily       DICLOFENAC SODIUM PO        FLUOXETINE HCL PO        LEVOTHYROXINE SODIUM PO        norethindrone (MICRONOR) 0.35 MG per tablet Take 1 tablet (0.35 mg) by mouth daily 112 tablet 3     OMEPRAZOLE PO        polyethylene glycol (MIRALAX) powder Take 17 g (1 capful) by mouth 2 times daily 510 g 3     TRAZODONE HCL PO      reviewed and verified with the patient    Review of Systems:   A 10-point ROS including constitutional, eyes, respiratory, cardiovascular, gastroenterology, genitourinary, integumentary, musculoskeletal, neurology and psychiatric were all negative except as mentioned in the interval history.      General Exam:   /63   Pulse 78   Resp 17   Wt 81.6 kg (180 lb)   SpO2 99%   GEN: Awake, NAD; good eye contact, responses appropriately via    Speech is fluent without dysarthria. EOM intact. There is no nystagmus. Has conjugated gaze. Face is symmetrical. Intact and symmetrical eyebrow and lid raise and eyelid closure, smiles. Hearing Intact to conversation speech. The palates elevates symmetrical. The tongue protrudes midline with no atrophy or fasciculations. Strength  5/5 in the upper and antigravity hip flexion bilaterally and left foot dorsiflexion 2/5. Reflexes 2+ symmetrical at biceps, triceps, brachioradialis, decreased patellar.Uses walker with walking  Assessment and Plan:    See Interval History for our discussion and plan    Time spent with pt answering questions, discussing findings, counseling and coordinating care was more than 50% the appointment time,  21 minutes.          AMBER Mustafa, CNP  [facility]

## 2019-08-23 ENCOUNTER — ANCILLARY PROCEDURE (OUTPATIENT)
Dept: MRI IMAGING | Facility: CLINIC | Age: 39
End: 2019-08-23
Attending: NURSE PRACTITIONER
Payer: COMMERCIAL

## 2019-08-23 DIAGNOSIS — M54.16 LUMBAR RADICULOPATHY: ICD-10-CM

## 2019-08-28 ENCOUNTER — OFFICE VISIT (OUTPATIENT)
Dept: NEUROLOGY | Facility: CLINIC | Age: 39
End: 2019-08-28
Payer: COMMERCIAL

## 2019-08-28 VITALS
WEIGHT: 179.6 LBS | HEART RATE: 79 BPM | DIASTOLIC BLOOD PRESSURE: 60 MMHG | RESPIRATION RATE: 16 BRPM | SYSTOLIC BLOOD PRESSURE: 110 MMHG | OXYGEN SATURATION: 100 %

## 2019-08-28 DIAGNOSIS — R20.2 PARESTHESIA: Primary | ICD-10-CM

## 2019-08-28 DIAGNOSIS — R73.09 ELEVATED HEMOGLOBIN A1C: ICD-10-CM

## 2019-08-28 DIAGNOSIS — G56.03 BILATERAL CARPAL TUNNEL SYNDROME: ICD-10-CM

## 2019-08-28 DIAGNOSIS — Z86.12 HISTORY OF POST-POLIO SYNDROME: ICD-10-CM

## 2019-08-28 RX ORDER — HYDROXYCHLOROQUINE SULFATE 200 MG/1
TABLET, FILM COATED ORAL
Refills: 1 | COMMUNITY
Start: 2019-08-21

## 2019-08-28 ASSESSMENT — PAIN SCALES - GENERAL: PAINLEVEL: NO PAIN (0)

## 2019-08-28 NOTE — PATIENT INSTRUCTIONS
Plan:  Continue blood sugar/diabetes management thru your primary care provider  Continue to follow up with rheumatologist  Follow up as needed if symptoms getting worse or any new symptoms.

## 2019-08-28 NOTE — LETTER
8/28/2019       RE: Lolly Jaeger  [address]  [facility] [address]     Dear Colleague,    Thank you for referring your patient, Lolly Jaeger, to the [facility] NEUROLOG[facility] at [facility]. Please see a copy of my visit note below.    Re: Lolly Jaeger      MRN# [id number]  YOB: 1980  Date of Visit:8/28/2019     OUTPATIENT NEUROLOGY VISIT NOTE    Reason for Visit:  Paresthesia and imaging  follow up    Interval History:  Lolly Jaeger is a 39-year-old female presents to the clinic today for lumbar MRI and paresthesia follow up. Chronic LLE weakness due to polio at the age of 1.   History of elevated ANGELIC and has been seeing a rheumatologist at [facility] rheumatology for lupus and just strated a new medication.   Initial Neurology visit on 6/13/2019 for feet and hands paresthesia.   Accompanied to today's appointment by a [facility] .   Reports history poliomyelitis when she was 1 years old and since then she has been experiencing weakness in the left leg.   Most of the symptoms in the right shoulder and pain at night. Numbness and tingling in the hands worse at night and per EMG reports consistent with CTS and radiculopathy. Recommended cervical MRI which patient completed cervical MRI on 7/25/2019 and no acute findings and reviewed.    Left LE weakness since 1 years old. Reports numbness in the right LE >left LE for about 6 months. Patient reports that she feels some pinching in her legs   Hands better with PT and looks like consistent with CTS.    Cervical MRI reviewed and does not explain patient's symptoms.  Lumbar MRI reviewed and does not explain patient's leg symptoms.   Today patient reports that legs paresthesia has been better after starting DM treatment.     Plan reviewed with the patient via :  Continue blood sugar/diabetes management thru your primary care provider  Continue to follow up with  rheumatologist  Follow up as needed if symptoms getting worse or any new symptoms.     Past Medical History reviewed   Social History     Tobacco Use     Smoking status: Never Smoker     Smokeless tobacco: Never Used   Substance Use Topics     Alcohol use: No    reviewed and verified with the patient   No Known Allergies    Current Outpatient Medications   Medication Sig Dispense Refill     acetaminophen (TYLENOL) 325 MG tablet Take 2 tablets (650 mg) by mouth every 4 hours as needed for mild pain 100 tablet 0     acetaminophen (TYLENOL) 500 MG tablet Take 2 tablets (1,000 mg) by mouth every 8 hours as needed for mild pain 100 tablet 1     Cholecalciferol (VITAMIN D) 1000 UNITS capsule Take 1 capsule by mouth daily       diclofenac (VOLTAREN) 1 % GEL Place onto the skin 4 times daily       DICLOFENAC SODIUM PO        FLUOXETINE HCL PO        hydroxychloroquine (PLAQUENIL) 200 MG tablet TAKE 1 TABLET BY MOUTH TWICE A DAY  1     LEVOTHYROXINE SODIUM PO        norethindrone (MICRONOR) 0.35 MG per tablet Take 1 tablet (0.35 mg) by mouth daily 112 tablet 3     OMEPRAZOLE PO        polyethylene glycol (MIRALAX) powder Take 17 g (1 capful) by mouth 2 times daily 510 g 3     TRAZODONE HCL PO      reviewed and verified with the patient    Review of Systems:   A 10-point ROS including constitutional, eyes, respiratory, cardiovascular, gastroenterology, genitourinary, integumentary, musculoskeletal, neurology and psychiatric were all negative except as mentioned in the interval history.     General Exam:   /60 (BP Location: Left arm, Patient Position: Sitting, Cuff Size: Adult Large)   Pulse 79   Resp 16   Wt 81.5 kg (179 lb 9.6 oz)   SpO2 100%   GEN: Awake, NAD; good eye contact, responses appropriately via . Speech is fluent without dysarthria. EOM intact. There is no nystagmus. Has conjugated gaze. Face is symmetrical. Intact and symmetrical eyebrow and lid raise and eyelid closure, smiles. Hearing  Intact to conversation speech. The palates elevates symmetrical. The tongue protrudes midline with no atrophy or fasciculations. Strength  5/5 in the upper and lower extremities bilaterally. Sensation is intact to  touch throughout.  Reflexes symmetrical at biceps, triceps, brachioradialis, patellar, and Achilles. Uses walker with walking.     Assessment and Plan:  See Interval History for our discussion and tony    I discussed all my recommendation with Lolly Jaeger. The patient verbalizes understanding and comfortable with the plan. The patient has our clinic phone number to call with any questions or concerns. All of the patient's questions were answered from the best of my current knowledge.     Time spent with pt answering questions, discussing findings, counseling and coordinating care was more than 50% the appointment time, 31  minutes.     AMBER Mustafa, CNP  [facility]

## 2019-08-28 NOTE — NURSING NOTE
Chief Complaint   Patient presents with     MRI RESULTS     [facility] RETURN     Mariella Joseph, CMA

## 2019-08-28 NOTE — PROGRESS NOTES
Re: Lolly Jaeger      MRN# [id number]  YOB: 1980  Date of Visit:8/28/2019     OUTPATIENT NEUROLOGY VISIT NOTE    Reason for Visit:  Paresthesia and imaging  follow up    Interval History:  Lolly Jaeger is a 39-year-old female presents to the clinic today for lumbar MRI and paresthesia follow up. Chronic LLE weakness due to polio at the age of 1.   History of elevated ANGELIC and has been seeing a rheumatologist at [facility] for lupus and just strated a new medication.   Initial Neurology visit on 6/13/2019 for feet and hands paresthesia.   Accompanied to today's appointment by a [facility] .   Reports history poliomyelitis when she was 1 years old and since then she has been experiencing weakness in the left leg.   Most of the symptoms in the right shoulder and pain at night. Numbness and tingling in the hands worse at night and per EMG reports consistent with CTS and radiculopathy. Recommended cervical MRI which patient completed cervical MRI on 7/25/2019 and no acute findings and reviewed.    Left LE weakness since 1 years old. Reports numbness in the right LE >left LE for about 6 months. Patient reports that she feels some pinching in her legs   Hands better with PT and looks like consistent with CTS.    Cervical MRI reviewed and does not explain patient's symptoms.  Lumbar MRI reviewed and does not explain patient's leg symptoms.   Today patient reports that legs paresthesia has been better after starting DM treatment.     Plan reviewed with the patient via :  Continue blood sugar/diabetes management thru your primary care provider  Continue to follow up with rheumatologist  Follow up as needed if symptoms getting worse or any new symptoms.         Past Medical History reviewed   Social History     Tobacco Use     Smoking status: Never Smoker     Smokeless tobacco: Never Used   Substance Use Topics     Alcohol use: No    reviewed and verified with the  patient   No Known Allergies    Current Outpatient Medications   Medication Sig Dispense Refill     acetaminophen (TYLENOL) 325 MG tablet Take 2 tablets (650 mg) by mouth every 4 hours as needed for mild pain 100 tablet 0     acetaminophen (TYLENOL) 500 MG tablet Take 2 tablets (1,000 mg) by mouth every 8 hours as needed for mild pain 100 tablet 1     Cholecalciferol (VITAMIN D) 1000 UNITS capsule Take 1 capsule by mouth daily       diclofenac (VOLTAREN) 1 % GEL Place onto the skin 4 times daily       DICLOFENAC SODIUM PO        FLUOXETINE HCL PO        hydroxychloroquine (PLAQUENIL) 200 MG tablet TAKE 1 TABLET BY MOUTH TWICE A DAY  1     LEVOTHYROXINE SODIUM PO        norethindrone (MICRONOR) 0.35 MG per tablet Take 1 tablet (0.35 mg) by mouth daily 112 tablet 3     OMEPRAZOLE PO        polyethylene glycol (MIRALAX) powder Take 17 g (1 capful) by mouth 2 times daily 510 g 3     TRAZODONE HCL PO      reviewed and verified with the patient    Review of Systems:   A 10-point ROS including constitutional, eyes, respiratory, cardiovascular, gastroenterology, genitourinary, integumentary, musculoskeletal, neurology and psychiatric were all negative except as mentioned in the interval history.     General Exam:   /60 (BP Location: Left arm, Patient Position: Sitting, Cuff Size: Adult Large)   Pulse 79   Resp 16   Wt 81.5 kg (179 lb 9.6 oz)   SpO2 100%   GEN: Awake, NAD; good eye contact, responses appropriately via . Speech is fluent without dysarthria. EOM intact. There is no nystagmus. Has conjugated gaze. Face is symmetrical. Intact and symmetrical eyebrow and lid raise and eyelid closure, smiles. Hearing Intact to conversation speech. The palates elevates symmetrical. The tongue protrudes midline with no atrophy or fasciculations. Strength  5/5 in the upper and lower extremities bilaterally. Sensation is intact to  touch throughout.  Reflexes symmetrical at biceps, triceps, brachioradialis,  patellar, and Achilles. Uses walker with walking.       Assessment and Plan:  See Interval History for our discussion and tony      I discussed all my recommendation with Lolly Jaeger. The patient verbalizes understanding and comfortable with the plan. The patient has our clinic phone number to call with any questions or concerns. All of the patient's questions were answered from the best of my current knowledge.     Time spent with pt answering questions, discussing findings, counseling and coordinating care was more than 50% the appointment time, 31  minutes.         AMBER Mustafa, CNP  [facility]

## 2019-10-15 ENCOUNTER — OFFICE VISIT (OUTPATIENT)
Dept: OBGYN | Facility: CLINIC | Age: 39
End: 2019-10-15
Payer: COMMERCIAL

## 2019-10-15 VITALS
SYSTOLIC BLOOD PRESSURE: 115 MMHG | HEART RATE: 77 BPM | WEIGHT: 175 LBS | DIASTOLIC BLOOD PRESSURE: 76 MMHG | TEMPERATURE: 97.2 F

## 2019-10-15 DIAGNOSIS — N89.8 VAGINAL ITCHING: ICD-10-CM

## 2019-10-15 DIAGNOSIS — Z01.419 ENCOUNTER FOR GYNECOLOGICAL EXAMINATION WITHOUT ABNORMAL FINDING: Primary | ICD-10-CM

## 2019-10-15 DIAGNOSIS — Z12.4 SCREENING FOR MALIGNANT NEOPLASM OF CERVIX: ICD-10-CM

## 2019-10-15 DIAGNOSIS — N92.6 IRREGULAR MENSES: ICD-10-CM

## 2019-10-15 LAB
SPECIMEN SOURCE: NORMAL
WET PREP SPEC: NORMAL

## 2019-10-15 PROCEDURE — 99213 OFFICE O/P EST LOW 20 MIN: CPT | Mod: 25 | Performed by: OBSTETRICS & GYNECOLOGY

## 2019-10-15 PROCEDURE — 99396 PREV VISIT EST AGE 40-64: CPT | Performed by: OBSTETRICS & GYNECOLOGY

## 2019-10-15 PROCEDURE — G0476 HPV COMBO ASSAY CA SCREEN: HCPCS | Performed by: OBSTETRICS & GYNECOLOGY

## 2019-10-15 PROCEDURE — 87210 SMEAR WET MOUNT SALINE/INK: CPT | Performed by: OBSTETRICS & GYNECOLOGY

## 2019-10-15 PROCEDURE — G0145 SCR C/V CYTO,THINLAYER,RESCR: HCPCS | Performed by: OBSTETRICS & GYNECOLOGY

## 2019-10-15 NOTE — PROGRESS NOTES
OB GYN CLINIC VISIT  10/15/2019   CC: annual    HPI:  Lolly is a 39 year old  female who presents for annual exam.     Menses are irregular and normal lasting 3 days.  Menses flow: normal and irregular.  Patient's last menstrual period was 07/15/2019. Irregularity started in 2018. In 2019 she started diabetes and weight loss medication. She has lost 9lb. Menses about every 2-4 months. Sexually active with one male partner. Using none for contraception.  She is not currently considering pregnancy. She does not think she is pregnant and declines pregnancy test today.   Besides routine health maintenance, she would like to discuss vaginal itching and dryness. Feels dry during intercourse. Sometimes itchy other times. No change in discharge.   Hgb A1c 19: 5.8  TSH 19: 1.26    GYNECOLOGIC HISTORY:  Menarche: 13  Age at first intercourse:  Number of lifetime partners: 1  Lolly is sexually active with 1male partner(s) and is currently in monogamous relationship.    History sexually transmitted infections:No STD history  STI testing offered?  Declined  MIKKI exposure: Unknown  History of abnormal Pap smear: NO - age 30- 65 PAP every 3 years recommended  Family history of breast CA: No  Family history of uterine/ovarian CA: No    Family history of colon CA: No    HEALTH MAINTENANCE:  Cholesterol: (No results found for: CHOL History of abnormal lipids: No  Mammo: no . History of abnormal Mammo: No.  Regular Self Breast Exams: No  Calcium/Vitamin D intake: source:  dairy, veggies Adequate? Yes  TSH: (  TSH   Date Value Ref Range Status   2019 1.26 0.40 - 4.00 mU/L Final    )  Pap; (No results found for: PAP )    HISTORY:  OB History    Para Term  AB Living   2 2 2 0 0 0   SAB TAB Ectopic Multiple Live Births   0 0 0 0 0      # Outcome Date GA Lbr Lm/2nd Weight Sex Delivery Anes PTL Lv   2 Term      -SEC      1 Term               Obstetric Comments   CS x1    x1   Both  girls  - one at age 3 (parapalegic[place], one at 1month of age [place] diarrhea [place]     Past Medical History:   Diagnosis Date     Diabetes mellitus (H)     metformin     Pain in left foot 2014 cuneiform fracture     Polio 1982    left leg weakness     SLE (systemic lupus erythematosus related syndrome) (H)      Past Surgical History:   Procedure Laterality Date      SECTION       History reviewed. No pertinent family history.  Social History     Socioeconomic History     Marital status:      Spouse name: None     Number of children: None     Years of education: None     Highest education level: None   Occupational History     None   Social Needs     Financial resource strain: None     Food insecurity:     Worry: None     Inability: None     Transportation needs:     Medical: None     Non-medical: None   Tobacco Use     Smoking status: Never Smoker     Smokeless tobacco: Never Used   Substance and Sexual Activity     Alcohol use: No     Drug use: No     Sexual activity: Not Currently   Lifestyle     Physical activity:     Days per week: None     Minutes per session: None     Stress: None   Relationships     Social connections:     Talks on phone: None     Gets together: None     Attends Confucianist service: None     Active member of club or organization: None     Attends meetings of clubs or organizations: None     Relationship status: None     Intimate partner violence:     Fear of current or ex partner: None     Emotionally abused: None     Physically abused: None     Forced sexual activity: None   Other Topics Concern     None   Social History Narrative     None       Current Outpatient Medications:      acetaminophen (TYLENOL) 325 MG tablet, Take 2 tablets (650 mg) by mouth every 4 hours as needed for mild pain, Disp: 100 tablet, Rfl: 0     acetaminophen (TYLENOL) 500 MG tablet, Take 2 tablets (1,000 mg) by mouth every 8 hours as needed for mild pain,  Disp: 100 tablet, Rfl: 1     Cholecalciferol (VITAMIN D) 1000 UNITS capsule, Take 1 capsule by mouth daily, Disp: , Rfl:      diclofenac (VOLTAREN) 1 % GEL, Place onto the skin 4 times daily, Disp: , Rfl:      DICLOFENAC SODIUM PO, , Disp: , Rfl:      FLUOXETINE HCL PO, , Disp: , Rfl:      hydroxychloroquine (PLAQUENIL) 200 MG tablet, TAKE 1 TABLET BY MOUTH TWICE A DAY, Disp: , Rfl: 1     LEVOTHYROXINE SODIUM PO, , Disp: , Rfl:      norethindrone (MICRONOR) 0.35 MG per tablet, Take 1 tablet (0.35 mg) by mouth daily, Disp: 112 tablet, Rfl: 3     OMEPRAZOLE PO, , Disp: , Rfl:      polyethylene glycol (MIRALAX) powder, Take 17 g (1 capful) by mouth 2 times daily, Disp: 510 g, Rfl: 3     TRAZODONE HCL PO, , Disp: , Rfl:    No Known Allergies    Past medical, surgical, social and family history were reviewed and updated in EPIC.    ROS:   C:     NEGATIVE for fever, chills, change in weight  I:       NEGATIVE for worrisome rashes, moles or lesions  E:     NEGATIVE for vision changes or irritation  E/M: NEGATIVE for ear, mouth and throat problems  R:     NEGATIVE for significant cough or SOB  CV:   NEGATIVE for chest pain, palpitations or peripheral edema  GI:     NEGATIVE for nausea, abdominal pain, heartburn, or change in bowel habits  :   NEGATIVE for frequency, dysuria, hematuria, vaginal discharge, or irregular bleeding  M:     NEGATIVE for significant arthralgias or myalgia  N:      NEGATIVE for weakness, dizziness or paresthesias  E:      NEGATIVE for temperature intolerance, skin/hair changes  P:      NEGATIVE for changes in mood or affect.    EXAM:  /76   Pulse 77   Temp 97.2  F (36.2  C) (Oral)   Wt 79.4 kg (175 lb)   LMP 07/15/2019    BMI: There is no height or weight on file to calculate BMI.     Constitutional: healthy, alert and no distress  Head: Normocephalic. No masses, lesions, tenderness or abnormalities  Neck: Neck supple. Trachea midline. No adenopathy. Thyroid symmetric, normal size.    Cardiovascular: RRR.   Respiratory: clear bilaterally.   Breast: No nodularity, asymmetry or nipple discharge bilaterally.  Gastrointestinal: Abdomen soft, non-tender, non-distended. No masses, organomegaly.  :  Vulva:  No external lesions, normal female hair distribution, no inguinal adenopathy.    Urethra:  Midline, non-tender, well supported, no discharge  Vagina:  Moist, pale and minimal rugae, no abnormal discharge, no lesions  Uterus:  Normal size , non-tender, freely mobile  Ovaries:  No masses appreciated, non-tender, mobile  Rectal Exam: deferred  Musculoskeletal: extremities normal  Skin: no suspicious lesions or rashes  Psychiatric: Affect appropriate, cooperative,mentation appears normal.     Wet prep neg    COUNSELING:   Reviewed preventive health counseling, as reflected in patient instructions       Regular exercise       Healthy diet/nutrition       Contraception       Family planning   reports that she has never smoked. She has never used smokeless tobacco.    There is no height or weight on file to calculate BMI.  Weight management plan: Discussed healthy diet and exercise guidelines already working with a specialist  FRAX Risk Assessment    ASSESSMENT:  39 year old female with satisfactory annual exam  1. Encounter for gynecological examination without abnormal finding    2. Screening for malignant neoplasm of cervix  - Pap imaged thin layer screen with HPV - recommended age 30 - 65 years (select HPV order below)  - HPV High Risk Types DNA Cervical    3. Vaginal itching  Suspect atrophic vaginitis. Discussed vaginal estrogen and lubricants. Would like to try lubricants first but if no improvement, encouraged patient to return to clinic and we can discuss her possible perimenopause and vaginal estrogen.  - Wet prep    4. Irregular menses  Suspect ovulatory dysfunction, possible perimenopause. Recommend UPT and hormone testing. Patient thinks it is due to weight loss and prefers to monitor for  now.     Due to language barrier, an  was present during the history-taking and subsequent discussion (and for part of the physical exam) with this patient.       Renetta Arteaga MD

## 2019-10-15 NOTE — LETTER
October 24, 2019    Lolly Jaeger  [address]   [place] [address]    Dear ,  This letter is regarding your recent Pap smear (cervical cancer screening) and Human Papillomavirus (HPV) test.  We are happy to inform you that your Pap smear result is normal. Cervical cancer is closely linked with certain types of HPV. Your results showed no evidence of high-risk HPV.  We recommend you have your next PAP smear in 3 years.  You will still need to return to the clinic every year for an annual exam and other preventive tests.  If you have additional questions regarding this result, please call our registered nurse, [place] at [phone].  Sincerely,    Renetta Arteaga MD //[facility]

## 2019-10-15 NOTE — PATIENT INSTRUCTIONS
Vaginal lubricants:  Lubricants may be water-based (eg, Astroglide, Slippery Stuff, K-Y Jelly), silicone-based (eg, Pjur, ID Millennium), or oil-based (Elegance Women's Lubricant);     If the lubricants don't help, come back and we can talk about vaginal estrogen

## 2019-10-18 LAB
COPATH REPORT: NORMAL
PAP: NORMAL

## 2019-10-22 LAB
FINAL DIAGNOSIS: NORMAL
HPV HR 12 DNA CVX QL NAA+PROBE: NEGATIVE
HPV16 DNA SPEC QL NAA+PROBE: NEGATIVE
HPV18 DNA SPEC QL NAA+PROBE: NEGATIVE
SPECIMEN DESCRIPTION: NORMAL
SPECIMEN SOURCE CVX/VAG CYTO: NORMAL

## 2019-11-15 PROBLEM — G56.03 BILATERAL CARPAL TUNNEL SYNDROME: Status: RESOLVED | Noted: 2019-07-23 | Resolved: 2019-11-15

## 2020-03-05 ENCOUNTER — ANCILLARY PROCEDURE (OUTPATIENT)
Dept: CARDIOLOGY | Facility: CLINIC | Age: 40
End: 2020-03-05
Attending: FAMILY MEDICINE
Payer: COMMERCIAL

## 2020-03-05 DIAGNOSIS — R07.9 CHEST PAIN: ICD-10-CM

## 2020-05-26 ENCOUNTER — APPOINTMENT (OUTPATIENT)
Dept: INTERPRETER SERVICES | Facility: CLINIC | Age: 40
End: 2020-05-26
Payer: COMMERCIAL

## 2023-01-09 ENCOUNTER — TRANSCRIBE ORDERS (OUTPATIENT)
Dept: OTHER | Age: 43
End: 2023-01-09

## 2023-01-09 DIAGNOSIS — H92.03 ACUTE EAR PAIN, BILATERAL: Primary | ICD-10-CM

## 2023-01-09 DIAGNOSIS — H92.13 EAR DRAINAGE, BILATERAL: ICD-10-CM

## 2023-01-10 NOTE — TELEPHONE ENCOUNTER
FUTURE VISIT INFORMATION      FUTURE VISIT INFORMATION:    Date: 2/23/23    Time: 12:30pm    Location: [facility]  REFERRAL INFORMATION:    Referring provider:  Andreia Castillo NP    Referring providers clinic:  [facility]    Reason for visit/diagnosis  Per Pt, dx Acute ear pain, bilateral [H92.03]; Ear drainage, bilateral [H92.13] , referral from Andreia Castillo NP  ,recs in epic    RECORDS REQUESTED FROM:       Clinic name Comments Records Status Imaging Status   [facility]   1/9/23- note from Andreia Castillo NP CE

## 2023-01-16 DIAGNOSIS — Z01.10 ENCOUNTER FOR HEARING TEST: Primary | ICD-10-CM

## 2023-02-21 ENCOUNTER — APPOINTMENT (OUTPATIENT)
Dept: INTERPRETER SERVICES | Facility: CLINIC | Age: 43
End: 2023-02-21
Payer: COMMERCIAL

## 2023-02-21 ENCOUNTER — TELEPHONE (OUTPATIENT)
Dept: OTOLARYNGOLOGY | Facility: CLINIC | Age: 43
End: 2023-02-21
Payer: COMMERCIAL

## 2023-02-22 NOTE — TELEPHONE ENCOUNTER
FUTURE VISIT INFORMATION      FUTURE VISIT INFORMATION:    Date: 3/2/23    Time: 9am    Location: [facility]  REFERRAL INFORMATION:    Referring provider:  Andreia Castillo NP    Referring providers clinic:  [facility]     Reason for visit/diagnosis  Per Pt, dx Acute ear pain, bilateral [H92.03]; Ear drainage, bilateral [H92.13] , referral from Andreia Castillo NP  ,recs in epic    RECORDS REQUESTED FROM:       Clinic name Comments Records Status Imaging Status   [facility] 1/9/23- note w/ Andreia Castillo NP Epic

## 2023-02-23 ENCOUNTER — PRE VISIT (OUTPATIENT)
Dept: OTOLARYNGOLOGY | Facility: CLINIC | Age: 43
End: 2023-02-23

## 2023-03-02 ENCOUNTER — PRE VISIT (OUTPATIENT)
Dept: OTOLARYNGOLOGY | Facility: CLINIC | Age: 43
End: 2023-03-02

## 2023-03-02 ENCOUNTER — OFFICE VISIT (OUTPATIENT)
Dept: OTOLARYNGOLOGY | Facility: CLINIC | Age: 43
End: 2023-03-02
Attending: NURSE PRACTITIONER
Payer: COMMERCIAL

## 2023-03-02 ENCOUNTER — OFFICE VISIT (OUTPATIENT)
Dept: AUDIOLOGY | Facility: CLINIC | Age: 43
End: 2023-03-02
Attending: NURSE PRACTITIONER
Payer: COMMERCIAL

## 2023-03-02 VITALS
DIASTOLIC BLOOD PRESSURE: 62 MMHG | SYSTOLIC BLOOD PRESSURE: 122 MMHG | TEMPERATURE: 96.5 F | WEIGHT: 217.4 LBS | HEART RATE: 76 BPM | OXYGEN SATURATION: 100 %

## 2023-03-02 DIAGNOSIS — H92.11 OTORRHEA, RIGHT: Primary | ICD-10-CM

## 2023-03-02 DIAGNOSIS — H92.03 ACUTE EAR PAIN, BILATERAL: ICD-10-CM

## 2023-03-02 DIAGNOSIS — H92.13 EAR DRAINAGE, BILATERAL: ICD-10-CM

## 2023-03-02 DIAGNOSIS — H92.03 ACUTE OTALGIA, BILATERAL: Primary | ICD-10-CM

## 2023-03-02 PROCEDURE — 92553 AUDIOMETRY AIR & BONE: CPT | Performed by: AUDIOLOGIST

## 2023-03-02 PROCEDURE — 87102 FUNGUS ISOLATION CULTURE: CPT | Mod: 90 | Performed by: PATHOLOGY

## 2023-03-02 PROCEDURE — 99000 SPECIMEN HANDLING OFFICE-LAB: CPT | Performed by: PATHOLOGY

## 2023-03-02 PROCEDURE — 99203 OFFICE O/P NEW LOW 30 MIN: CPT | Mod: 25 | Performed by: REGISTERED NURSE

## 2023-03-02 PROCEDURE — 92550 TYMPANOMETRY & REFLEX THRESH: CPT | Mod: 52 | Performed by: AUDIOLOGIST

## 2023-03-02 PROCEDURE — 87070 CULTURE OTHR SPECIMN AEROBIC: CPT | Mod: 90 | Performed by: PATHOLOGY

## 2023-03-02 PROCEDURE — 92504 EAR MICROSCOPY EXAMINATION: CPT | Performed by: REGISTERED NURSE

## 2023-03-02 RX ORDER — GABAPENTIN 300 MG/1
1 CAPSULE ORAL EVERY EVENING
COMMUNITY
Start: 2022-07-06

## 2023-03-02 RX ORDER — CIPROFLOXACIN AND DEXAMETHASONE 3; 1 MG/ML; MG/ML
SUSPENSION/ DROPS AURICULAR (OTIC)
COMMUNITY
Start: 2023-01-10

## 2023-03-02 RX ORDER — LEVOTHYROXINE SODIUM 100 UG/1
100 TABLET ORAL
COMMUNITY
Start: 2023-01-03

## 2023-03-02 RX ORDER — AMOXICILLIN 250 MG
1 CAPSULE ORAL
COMMUNITY
Start: 2021-12-29

## 2023-03-02 RX ORDER — ACETAMINOPHEN 500 MG
500 TABLET ORAL
COMMUNITY
Start: 2022-07-06

## 2023-03-02 RX ORDER — LANOLIN ALCOHOL/MO/W.PET/CERES
6 CREAM (GRAM) TOPICAL
COMMUNITY
Start: 2022-02-09

## 2023-03-02 RX ORDER — ACETAMINOPHEN 160 MG
2 TABLET,DISINTEGRATING ORAL DAILY
COMMUNITY
Start: 2023-01-31

## 2023-03-02 ASSESSMENT — PAIN SCALES - GENERAL: PAINLEVEL: SEVERE PAIN (7)

## 2023-03-02 NOTE — PROGRESS NOTES
AUDIOLOGY REPORT    SUMMARY: Audiology visit completed. See audiogram for results.      RECOMMENDATIONS: Follow-up with ENT.      Kristy Robert.  Licensed Audiologist  [place] #[id number]

## 2023-03-02 NOTE — PROGRESS NOTES
Otolaryngology Clinic  2023    Chief Complaint:   Bilateral ear pain and drainage     History of Present Illness:   Lolly Jaeger is a 43 year old female who presents today for evaluation of chronic bilateral ear pain and drainage. Seen by PCP in early January and prescribed ciprodex drops. Per telehealth medicine note, patient's symptoms improved with drops.     Patient's history is obtained with assistance of an inperson [facility] . Patient reports longstanding history of chronic otorrhea and otalgia. This has been present since childhood in [facility]. Would receive 5 shots that would improve symptoms for a few months but then return.    Since moving to the [place], patient continues to have intermittent otorrhea. Seems to be worse in the right ear but is typically bilateral. Will treat with ear drops that improve symptoms after 3-5 days. Patient will be asymptomatic for a couple of months and then drainage, pain and itching returns. Patient uses q-tips frequently to itch ears. Never any bleeding from ears.     Patient denies any dizziness, tinnitus, hearing loss, facial numbness/weakness, or ear surgeries.     Past Medical History:  Past Medical History:   Diagnosis Date     Diabetes mellitus (H)     metformin     Pain in left foot 2014 cuneiform fracture     Polio 1982    left leg weakness     SLE (systemic lupus erythematosus related syndrome) (H)      Past Surgical History:  Past Surgical History:   Procedure Laterality Date      SECTION       Medications:  Current Outpatient Medications   Medication Sig Dispense Refill     acetaminophen (TYLENOL) 325 MG tablet Take 2 tablets (650 mg) by mouth every 4 hours as needed for mild pain 100 tablet 0     acetaminophen (TYLENOL) 500 MG tablet Take 2 tablets (1,000 mg) by mouth every 8 hours as needed for mild pain 100 tablet 1     Cholecalciferol (VITAMIN D) 1000 UNITS capsule Take 1 capsule by mouth daily        diclofenac (VOLTAREN) 1 % GEL Place onto the skin 4 times daily       DICLOFENAC SODIUM PO        FLUOXETINE HCL PO        hydroxychloroquine (PLAQUENIL) 200 MG tablet TAKE 1 TABLET BY MOUTH TWICE A DAY  1     LEVOTHYROXINE SODIUM PO        norethindrone (MICRONOR) 0.35 MG per tablet Take 1 tablet (0.35 mg) by mouth daily 112 tablet 3     OMEPRAZOLE PO        polyethylene glycol (MIRALAX) powder Take 17 g (1 capful) by mouth 2 times daily 510 g 3     TRAZODONE HCL PO        Allergies:  No Known Allergies     Social History:  Social History     Tobacco Use     Smoking status: Never     Smokeless tobacco: Never   Substance Use Topics     Alcohol use: No     Drug use: No       ROS: 10 point ROS neg other than the symptoms noted above in the HPI.    Physical Exam:    There were no vitals taken for this visit.     Constitutional:  The patient was unaccompanied, well-groomed, and in no acute distress.     Neurologic: Alert and oriented x 3.  CN's III-XII within normal limits.  Voice normal.   Psychiatric: The patient's affect was calm, cooperative, and appropriate.    Communication:  Normal; communicates verbally, normal voice quality.   Respiratory: Breathing comfortably without stridor or exertion of accessory muscles.   Ears: Pinnae and tragus non-tender.  EAC's and TM's were clear.       Otologic microscope exam:    Patient's ear pathology required use of the binocular microscope for the purpose of cleaning and improving visualization in order to assure a more accurate diagnostic evaluation.    Right ear was examined under the microscope.  Ear canal is clean and dry. TM intact. There is a small area of clear moisture at anterior portion of canal against TM. This was cultured. Nicely aerated middle ear space.      Left ear was also examined under the microscope.  Ear canal is clean and dry. Normal appearing TM, nicely aerated middle ear space. No areas of moisture or excoriation.        Audiogram: 3/2/2023- data  independently reviewed  Normal sloping to moderately severe SNHL at 8 kHz.    Tympanograms: type A bilaterally    Assessment and Plan:  1. Ear drainage, bilateral  Patient with chronic otorrhea. Recently treated with ciprodex which has improved symptoms bilaterally. Ear exam today does reveal a scant amount of clear drainage in the anterior portion of canal. This was cultured today. Will treat with drops depending on culture results.     Encouraged patient to refrain from q-tip use which can actually exacerbate ear itching symptoms.     Reviewed audiogram with patient that shows normal hearing bilaterally with exception of a bilateral high frequency hearing loss at 8 kHz. Patient is not bothered by hearing loss. Continue to monitor.    Recommend returning to clinic in 3 months for recheck of ears. If patient has pain or drainage sooner, she should contact clinic for repeat ear exam.    Coco Merchant DNP, APRN, CNP  Otolaryngology  Head & Neck Surgery  [phone]    30 minutes spent on the date of the encounter doing chart review, history and exam, documentation and further activities per the note

## 2023-03-02 NOTE — PATIENT INSTRUCTIONS
- You were seen in the ENT Clinic today by Coco Merchant NP.   - Will call with culture results.  - Follow up in 3 months for recheck of ears.  - Please send a [employer] message or call the ENT clinic at [phone] with any questions or concerns.

## 2023-03-04 LAB — BACTERIA SPEC CULT: NO GROWTH

## 2023-03-30 LAB — BACTERIA SPEC CULT: NO GROWTH

## 2023-11-14 ENCOUNTER — TRANSCRIBE ORDERS (OUTPATIENT)
Dept: OTHER | Age: 43
End: 2023-11-14

## 2023-11-14 DIAGNOSIS — M32.9 SYSTEMIC LUPUS ERYTHEMATOSUS, UNSPECIFIED SLE TYPE, UNSPECIFIED ORGAN INVOLVEMENT STATUS (H): Primary | ICD-10-CM

## 2024-03-21 ENCOUNTER — LAB (OUTPATIENT)
Dept: LAB | Facility: CLINIC | Age: 44
End: 2024-03-21
Payer: COMMERCIAL

## 2024-03-21 ENCOUNTER — OFFICE VISIT (OUTPATIENT)
Dept: RHEUMATOLOGY | Facility: CLINIC | Age: 44
End: 2024-03-21
Attending: FAMILY MEDICINE
Payer: COMMERCIAL

## 2024-03-21 VITALS
HEART RATE: 80 BPM | SYSTOLIC BLOOD PRESSURE: 122 MMHG | DIASTOLIC BLOOD PRESSURE: 76 MMHG | OXYGEN SATURATION: 99 % | WEIGHT: 209.8 LBS

## 2024-03-21 DIAGNOSIS — M25.50 ARTHRALGIA, UNSPECIFIED JOINT: ICD-10-CM

## 2024-03-21 DIAGNOSIS — L65.9 ALOPECIA: ICD-10-CM

## 2024-03-21 DIAGNOSIS — M32.9 SYSTEMIC LUPUS ERYTHEMATOSUS, UNSPECIFIED SLE TYPE, UNSPECIFIED ORGAN INVOLVEMENT STATUS (H): ICD-10-CM

## 2024-03-21 DIAGNOSIS — M32.9 SYSTEMIC LUPUS ERYTHEMATOSUS, UNSPECIFIED SLE TYPE, UNSPECIFIED ORGAN INVOLVEMENT STATUS (H): Primary | ICD-10-CM

## 2024-03-21 LAB
ALBUMIN UR-MCNC: NEGATIVE MG/DL
APPEARANCE UR: CLEAR
BACTERIA #/AREA URNS HPF: ABNORMAL /HPF
BILIRUB UR QL STRIP: NEGATIVE
COLOR UR AUTO: YELLOW
ERYTHROCYTE [SEDIMENTATION RATE] IN BLOOD BY WESTERGREN METHOD: 10 MM/HR (ref 0–20)
GLUCOSE UR STRIP-MCNC: NEGATIVE MG/DL
HGB UR QL STRIP: NEGATIVE
KETONES UR STRIP-MCNC: NEGATIVE MG/DL
LEUKOCYTE ESTERASE UR QL STRIP: ABNORMAL
MUCOUS THREADS #/AREA URNS LPF: PRESENT /LPF
NITRATE UR QL: NEGATIVE
PH UR STRIP: 5.5 [PH] (ref 5–7)
RBC #/AREA URNS AUTO: ABNORMAL /HPF
SP GR UR STRIP: 1.02 (ref 1–1.03)
SQUAMOUS #/AREA URNS AUTO: ABNORMAL /LPF
UROBILINOGEN UR STRIP-ACNC: 0.2 E.U./DL
WBC #/AREA URNS AUTO: ABNORMAL /HPF

## 2024-03-21 PROCEDURE — 86140 C-REACTIVE PROTEIN: CPT

## 2024-03-21 PROCEDURE — 86225 DNA ANTIBODY NATIVE: CPT

## 2024-03-21 PROCEDURE — 99205 OFFICE O/P NEW HI 60 MIN: CPT | Performed by: STUDENT IN AN ORGANIZED HEALTH CARE EDUCATION/TRAINING PROGRAM

## 2024-03-21 PROCEDURE — 86160 COMPLEMENT ANTIGEN: CPT

## 2024-03-21 PROCEDURE — 85652 RBC SED RATE AUTOMATED: CPT

## 2024-03-21 PROCEDURE — 36415 COLL VENOUS BLD VENIPUNCTURE: CPT

## 2024-03-21 PROCEDURE — 84156 ASSAY OF PROTEIN URINE: CPT

## 2024-03-21 PROCEDURE — 86235 NUCLEAR ANTIGEN ANTIBODY: CPT

## 2024-03-21 PROCEDURE — 86039 ANTINUCLEAR ANTIBODIES (ANA): CPT

## 2024-03-21 PROCEDURE — 81001 URINALYSIS AUTO W/SCOPE: CPT

## 2024-03-21 PROCEDURE — 86038 ANTINUCLEAR ANTIBODIES: CPT

## 2024-03-21 PROCEDURE — G0463 HOSPITAL OUTPT CLINIC VISIT: HCPCS | Performed by: STUDENT IN AN ORGANIZED HEALTH CARE EDUCATION/TRAINING PROGRAM

## 2024-03-21 ASSESSMENT — PAIN SCALES - GENERAL: PAINLEVEL: SEVERE PAIN (6)

## 2024-03-21 NOTE — NURSING NOTE
Chief Complaint   Patient presents with    Consult     /76 (BP Location: Left arm, Patient Position: Sitting, Cuff Size: Adult Regular)   Pulse 80   Wt 95.2 kg (209 lb 12.8 oz)   SpO2 99%   Kaleigh ARMENDARIZ

## 2024-03-21 NOTE — LETTER
March 29, 2024      Lolly Jaeger  [address]   [place] [address]        Dear ,    We are writing to inform you of your test results.    The lupus screening lab [ANGELIC] is positive but otherwise labs are negative for lupus.  X-rays of the knees shows significant wear-and-tear.  We will await evaluation by the sports clinic to see if there is inflammation in the knees as well.  Follow-up in the clinic remains as scheduled.        Resulted Orders   Complement C4   Result Value Ref Range    C4 Complement 39 13 - 39 mg/dL   Complement C3   Result Value Ref Range    C3 Complement 155 81 - 157 mg/dL   DNA double stranded antibodies   Result Value Ref Range    DNA (ds) Antibody 1.2 <10.0 IU/mL      Comment:      Negative    Narrative    Negative:  Less than 10  Equivocal: 10-15  Positive:  Greater than 15   CINDY antibody panel   Result Value Ref Range    RNP Antonella IgG Instrument Value 1.6 <5.0 U/mL    RNP Antibody IgG Negative Negative    Richardson CINDY Antonella IgG Instrument Value <0.7 <7.0 U/mL    Smith CINDY Antibody IgG Negative Negative    SSA Antonella IgG Instrument Value <0.5 <7.0 U/mL    SSA (Ro) Antibody IgG Negative Negative    SSB Antonella IgG Instrument Value <0.6 <7.0 U/mL    SSB (La) Antibody IgG Negative Negative   Protein  random urine   Result Value Ref Range    Total Protein Urine mg/dL 10.7   mg/dL      Comment:      The reference ranges have not been established in urine protein. The results should be integrated into the clinical context for interpretation.    Total Protein Urine mg/mg Creat 0.06 0.00 - 0.20 mg/mg Cr    Creatinine Urine mg/dL 182.0 mg/dL      Comment:      The reference ranges have not been established in urine creatinine. The results should be integrated into the clinical context for interpretation.   UA with Microscopic   Result Value Ref Range    Color Urine Yellow Colorless, Straw, Light Yellow, Yellow    Appearance Urine Clear Clear    Glucose Urine Negative Negative  mg/dL    Bilirubin Urine Negative Negative    Ketones Urine Negative Negative mg/dL    Specific Gravity Urine 1.020 1.003 - 1.035    Blood Urine Negative Negative    pH Urine 5.5 5.0 - 7.0    Protein Albumin Urine Negative Negative mg/dL    Urobilinogen Urine 0.2 0.2, 1.0 E.U./dL    Nitrite Urine Negative Negative    Leukocyte Esterase Urine Trace (A) Negative   Anti Nuclear Antonella IgG by IFA with Reflex   Result Value Ref Range    ANGELIC interpretation Positive (A) Negative      Comment:        Negative:              <1:40  Borderline Positive:   1:40 - 1:80  Positive:              >1:80    ANGELIC pattern 1 Speckled     ANGELIC titer 1 1:320    ESR   Result Value Ref Range    Erythrocyte Sedimentation Rate 10 0 - 20 mm/hr   CRP inflammation   Result Value Ref Range    CRP Inflammation <3.00 <5.00 mg/L   Urine Microscopic Exam   Result Value Ref Range    Bacteria Urine None Seen None Seen /HPF    RBC Urine 0-2 0-2 /HPF /HPF    WBC Urine 0-5 0-5 /HPF /HPF    Squamous Epithelials Urine Few (A) None Seen /LPF    Mucus Urine Present (A) None Seen /LPF       If you have any questions or concerns, please call the clinic at the number listed above.       Sincerely,      Nya Segal MD

## 2024-03-21 NOTE — PROGRESS NOTES
RHEUMATOLOGY OUTPATIENT CLINIC NOTE     Referring Provider:  Barb Enamorado  [address] [place] [address]    Subjective     Visit date March 21, 2024  Lolly Jaeger is a 44 year old female who presents today for consult. [employer]  used throughout the encounter.     She was evaluated by rheumatologist previously around 3 years ago (no reports available for review) and she was told she has lupus and was placed on hydroxychloroquine 200 mg twice daily for the past 3 years.     She has been having joint pain involving shoulders, elbows, knees but not much in the hands or feet. Pain is since 2013 and getting worse, sometimes has swelling in the knees. Activity makes pain worse. She took Gabapentin with some benefit. She is taking hydroxychloroquine and helped with joint pain. She received injections in her knees and right shoulder for pain. She feels steroid injections helped in her joints. She has history of polio since age of 1 with subsequent weakness in her left lower extremity foot, she has been using walker for ambulation since 2014. She has been following with orthopedics for knee pain, last evaluated in December 2023, her last knee injection was in October 2023.     She used to have oral sores (tongue, lower lip), painful, none over the palate, resolved after hydroxychloroquine   She has areas of alopecia, she has not been evaluated by dermatology  She has mouth and eye dryness, she uses eye drops twice daily.   She denies skin rash, raynaud's,   She denies pleuritic chest pain, shortness of breath or chronic cough   She denies fever, weight loss.    She has had an eye exams locally, last was in October 2023 and there were reportedly no signs of toxicity per patient (no reports available).     She has never smoked, no family history of autoimmune disease or lupus    ROS    Current Medications   Hydroxychloroquine 200 mg twice daily     Labs   Outside labs in March 2024 reviewed showing  hemoglobin 14.4, WBC 5.1, platelet count 249, normal differential count.  Creatinine 0.58, AST 17, ALT 9.    Imaging review   MR cervical spine 12/2023:  1. Motion artifact degrades image quality.   2. Degenerative findings without high-grade spinal canal or neural foraminal stenosis.   3. Mild spinal canal stenosis from C3 to C6.   4. Mild left neural foraminal stenosis at C7-T1.     X-ray cervical spine 11/2023: Mild mid and lower cervical degenerative change. Vertebral height and alignment are normal on the lateral view. No fracture or soft tissue swelling. Cervical vertebrae suboptimally seen on the AP view due to projected artifact.     MRI left knee, 11/2023:  1. Moderate to high-grade cartilage loss, far lateral weightbearing aspect of the lateral compartment and far anterior weightbearing aspect of the tibial plateau. Moderate femoral and anterior tibial subchondral marrow edema; would favor that these represent adjacent areas of bone contusion. No discrete fracture line identified..   2. The anterior horn of the lateral meniscus is not well seen which may reflect degenerative change/maceration, or previous meniscectomy in this area. The body and posterior horn are intact.   3. Marked fatty atrophy of the muscles of the distal thigh and proximal leg suggesting denervation.     Knee X-ray, 9/2023:  Right knee: No acute fracture or dislocation identified. Varus alignment with advanced medial compartment joint space narrowing again noted. Moderate narrowing of medial patellofemoral joint similar to previous. Tricompartment marginal osteophytes. Small-to-moderate joint effusion. Gracile appearance of the bones again noted.     Left knee: No acute fracture or dislocation identified. Moderate narrowing of the lateral and patellofemoral compartments again noted. Patella baja redemonstrated. No significant joint effusion. Gracile appearance of the bones again noted.     Objective   /76 (BP Location: Left arm,  Patient Position: Sitting, Cuff Size: Adult Regular)   Pulse 80   Wt 95.2 kg (209 lb 12.8 oz)   SpO2 99%       PHYSICAL EXAMINATION  Physical Exam  HENT:      Mouth/Throat:      Mouth: Mucous membranes are moist.   Eyes:      Conjunctiva/sclera: Conjunctivae normal.   Cardiovascular:      Heart sounds: Normal heart sounds.   Pulmonary:      Effort: Pulmonary effort is normal.      Breath sounds: Normal breath sounds.   Musculoskeletal:         General: Swelling and tenderness present.      Comments: No synovitis in the hands, elbows, feet.   Bilateral swelling and tenderness in both knee     Skin:     Findings: No rash.   Neurological:      Mental Status: She is alert.      Comments: Weakness and atrophy in left lower extremity related to polio       Assessment & Plan     # History of outside diagnosis of lupus  # Arthralgia with bilateral knee arthritis    Lolly very pleasant 44-year-old Peruvian speaking female referred today for evaluation of lupus.  She has history of polio with lower extremity weakness mainly in the left leg and she has been ambulating with a walker for several years, there is significant degenerative disease in her knees however there is joint effusion on exam which could be related to degenerative disease however cannot rule out inflammatory arthropathy.     She has history of oral sores, mild sicca symptoms as well as alopecia therefore she could have some features suggestive of systemic lupus.  Unfortunately there are no records from her outside rheumatologist for review.  She seems to have been tolerating hydroxychloroquine well.  There is bilateral knee swelling and tenderness today on exam otherwise no other features of active synovitis.    We discussed getting evaluation by our sports clinic colleagues for her knee arthritis with consideration for ultrasound-guided aspiration to look for inflammatory cell count.  She will also benefit from referral to our colleagues in dermatology  for evaluation of alopecia.    In the meantime, we will check lab markers for lupus as well as inflammatory markers and urine studies to verify her diagnosis and evaluate for organ involvement.      I recommended that she send us the records for her eye exam in order to prescribe hydroxychloroquine for her.    I will see her back after test results and consultation to discuss the next steps.      70 minutes spent by me on the date of the encounter doing chart review, history and exam, documentation and further activities per the note      Return in about 8 weeks (around 5/16/2024) for Follow up.    Nya Segal MD  [facility]

## 2024-03-22 LAB
ALBUMIN MFR UR ELPH: 10.7 MG/DL
CREAT UR-MCNC: 182 MG/DL
CRP SERPL-MCNC: <3 MG/L
PROT/CREAT 24H UR: 0.06 MG/MG CR (ref 0–0.2)

## 2024-03-25 LAB
ANA PAT SER IF-IMP: ABNORMAL
ANA SER QL IF: POSITIVE
ANA TITR SER IF: ABNORMAL {TITER}
C3 SERPL-MCNC: 155 MG/DL (ref 81–157)
C4 SERPL-MCNC: 39 MG/DL (ref 13–39)

## 2024-03-26 LAB
DSDNA AB SER-ACNC: 1.2 IU/ML
ENA SM IGG SER IA-ACNC: <0.7 U/ML
ENA SM IGG SER IA-ACNC: NEGATIVE
ENA SS-A AB SER IA-ACNC: <0.5 U/ML
ENA SS-A AB SER IA-ACNC: NEGATIVE
ENA SS-B IGG SER IA-ACNC: <0.6 U/ML
ENA SS-B IGG SER IA-ACNC: NEGATIVE
U1 SNRNP IGG SER IA-ACNC: 1.6 U/ML
U1 SNRNP IGG SER IA-ACNC: NEGATIVE

## 2024-03-27 ENCOUNTER — APPOINTMENT (OUTPATIENT)
Dept: INTERPRETER SERVICES | Facility: CLINIC | Age: 44
End: 2024-03-27
Payer: COMMERCIAL

## 2024-03-27 ENCOUNTER — TELEPHONE (OUTPATIENT)
Dept: RHEUMATOLOGY | Facility: CLINIC | Age: 44
End: 2024-03-27

## 2024-03-27 NOTE — TELEPHONE ENCOUNTER
Called pt and LVM. Dr. Segal would like to see this pt sooner then her current appt that is scheduled.

## 2024-03-28 ENCOUNTER — HOSPITAL ENCOUNTER (OUTPATIENT)
Dept: GENERAL RADIOLOGY | Facility: CLINIC | Age: 44
Discharge: HOME | End: 2024-03-28
Attending: STUDENT IN AN ORGANIZED HEALTH CARE EDUCATION/TRAINING PROGRAM | Admitting: STUDENT IN AN ORGANIZED HEALTH CARE EDUCATION/TRAINING PROGRAM
Payer: COMMERCIAL

## 2024-03-28 DIAGNOSIS — M25.50 ARTHRALGIA, UNSPECIFIED JOINT: ICD-10-CM

## 2024-03-28 PROCEDURE — 73560 X-RAY EXAM OF KNEE 1 OR 2: CPT | Mod: 50

## 2024-03-29 NOTE — RESULT ENCOUNTER NOTE
Conjuntivae and eyelids appear normal,  Sclerae : White without injection Positive ANGELIC, 1:320 speckled, otherwise negative dsDNA, CINDY panel, normal complements, urinalysis without cells or proteins.  X-ray knees with significant degenerative changes without evidence of significant joint effusion.  Await evaluation by sports clinic and consideration of ultrasound-guided aspiration to evaluate for inflammatory arthropathy.     Nya Segal MD

## 2024-04-11 ENCOUNTER — OFFICE VISIT (OUTPATIENT)
Dept: ORTHOPEDICS | Facility: CLINIC | Age: 44
End: 2024-04-11
Attending: STUDENT IN AN ORGANIZED HEALTH CARE EDUCATION/TRAINING PROGRAM
Payer: COMMERCIAL

## 2024-04-11 VITALS — WEIGHT: 209 LBS

## 2024-04-11 DIAGNOSIS — M25.50 ARTHRALGIA, UNSPECIFIED JOINT: ICD-10-CM

## 2024-04-11 DIAGNOSIS — M17.0 PRIMARY OSTEOARTHRITIS OF BOTH KNEES: Primary | ICD-10-CM

## 2024-04-11 PROCEDURE — 20611 DRAIN/INJ JOINT/BURSA W/US: CPT | Mod: 50 | Performed by: FAMILY MEDICINE

## 2024-04-11 PROCEDURE — 99244 OFF/OP CNSLTJ NEW/EST MOD 40: CPT | Mod: 25 | Performed by: FAMILY MEDICINE

## 2024-04-11 RX ADMIN — ROPIVACAINE HYDROCHLORIDE 4 ML: 5 INJECTION, SOLUTION EPIDURAL; INFILTRATION; PERINEURAL at 14:11

## 2024-04-11 RX ADMIN — BETAMETHASONE SODIUM PHOSPHATE AND BETAMETHASONE ACETATE 6 MG: 3; 3 INJECTION, SUSPENSION INTRA-ARTICULAR; INTRALESIONAL; INTRAMUSCULAR; SOFT TISSUE at 14:11

## 2024-04-11 NOTE — PATIENT INSTRUCTIONS
# Bilateral Knee Arthritis: Lolly Jaeger  was seen today for acute on chronic bilateral knee pain. Symptoms had been going on for years, worsening over the past few months. On examination there are positive findings of tenderness to palpation over the knee joint lines. Imaging findings showed bilateral knee arthritis right > left. Likely cause of patient's condition due to flare of knee osteoarthritis. Other possible conditions contributing to symptoms include flare of lupus.  Reviewed ultrasound evaluation today showing no knee effusion. Counseled patient on nature of condition and treatment options.  Previous knee joint steroid injections helped last year. Given this plan as below, follow-up 1 mon as needed.     Image Findings: bilateral knee right > left arthritis  Treatment: Activities as tolerated, home exercises given today, PT referral placed  Medications/Injections: Limited tylenol/ibuprofen for pain for 1-2 weeks, Topical Voltaren gel, bilateral knee joint steroid injections  Follow-up: In one month if symptoms do not improve, sooner if worsening  Can consider gel injection     Please call [phone]   Ask for my team if you have any questions or concerns    If you have not yet received the influenza vaccine but would like to get one, please call  [phone] or you can schedule via [employer]    It was great seeing you today!    Rajeev Resendiz MD, [facility]     FS Injection Discharge Instructions    Procedure: bilateral knee joint steroid injections    You may shower, however avoid swimming, tub baths or hot tubs for 24 hours following your procedure  You may have a mild to moderate increase in pain for several days following the injection.  It may take up to 14 days for the steroid medication to start working although you may feel the effect as early as a few days after the procedure.  You may use ice packs for 10-15 minutes, 3 to 4 times a day at the injection site for comfort  You may use  anti-inflammatory medications (such as Ibuprofen or Aleve or Advil) or Tylenol for pain control if necessary  If you were fasting, you may resume your normal diet and medications after the procedure  If you have diabetes, check your blood sugar more frequently than usual as your blood sugar may be higher than normal for 10-14 days following a steroid injection. Contact your doctor who manages your diabetes if your blood sugar is higher than usual    If you experience any of the following, call [facility] @ [phone] or [phone]  -Fever over 100 degree F  -Swelling, bleeding, redness, drainage, warmth at the injection site  - New or worsening pain

## 2024-04-11 NOTE — PROGRESS NOTES
ASSESSMENT & PLAN    Lolly was seen today for pain and pain.    Diagnoses and all orders for this visit:    Primary osteoarthritis of both knees  -     Large Joint Injection/Arthocentesis: bilateral knee  -     Physical Therapy  Referral; Future    Arthralgia, unspecified joint  -     Orthopedic  Referral  -     Large Joint Injection/Arthocentesis: bilateral knee      This issue is acute on chronic and Worsening.    # Bilateral Knee Arthritis: Lolly Jaeger  was seen today for acute on chronic bilateral knee pain. Symptoms had been going on for years, worsening over the past few months. On examination there are positive findings of tenderness to palpation over the knee joint lines. Imaging findings showed bilateral knee arthritis right > left. Likely cause of patient's condition due to flare of knee osteoarthritis. Other possible conditions contributing to symptoms include flare of lupus.  Reviewed ultrasound evaluation today showing no knee effusion. Counseled patient on nature of condition and treatment options.  Previous knee joint steroid injections helped last year. Given this plan as below, follow-up 1 mon as needed.     Image Findings: bilateral knee right > left arthritis  Treatment: Activities as tolerated, home exercises given today, PT referral placed  Medications/Injections: Limited tylenol/ibuprofen for pain for 1-2 weeks, Topical Voltaren gel, bilateral knee joint steroid injections  Follow-up: In one month if symptoms do not improve, sooner if worsening  Can consider gel injection       Rajeev Resendiz MD  [facility] [place]    -----  Chief Complaint   Patient presents with    Right Knee - Pain    Left Knee - Pain       SUBJECTIVE  Lolly Jaeger is a/an 44 year old female who is seen in consultation at the request of  Nya Segal M.D. for evaluation of bilateral knee pain. Reviewed rheumatology notes.    The patient is seen by  themselves.    Visit completed with phone     Onset: Several years(s) ago. Reports insidious onset without acute precipitating event. Saw Rheumatology 3/21/24. Bilateral knee xrays perfomred 3/28/24.   Location of Pain: bilateral knee pain  Worsened by: walking, increased use  Better with: steroid injections   Treatments tried:gabapentin, steroid injections, walker   Associated symptoms: no distal numbness or tingling; denies swelling or warmth    Orthopedic/Surgical history: YES -Polio, lupus, Previously treating at  Orthopedics, chronic multiple joint pain        No family history pertinent to patient's problem today.      REVIEW OF SYSTEMS:  Review of Systems  Constitutional, HEENT, cardiovascular, pulmonary, gi and gu systems are negative, except as otherwise noted.    OBJECTIVE:  Wt 94.8 kg (209 lb)    General: healthy, alert and in no distress  HEENT: no scleral icterus or conjunctival erythema  Skin: no suspicious lesions or rash. No jaundice.  CV: distal perfusion intact    Resp: normal respiratory effort without conversational dyspnea   Psych: normal mood and affect  Gait: normal steady gait with appropriate coordination and balance    Neuro: Normal light sensory exam of bilateral lower extremities    Ortho Exam   BILATERAL KNEE  Inspection:    Normal alignment; no edema, erythema, or ecchymosis present  Palpation:    Tender about the lateral joint line and medial joint line. Remainder of bony and ligamentous landmarks are nontender.    No effusion is present    Patellofemoral crepitus is Present  Range of Motion:     00 extension to 1350 flexion  Strength:    Quadriceps 5/5, hamstrings 5/5, gastrocsoleus 5/5, and tibialis anterior 5/5    Extensor mechanism intact  Special Tests:    Positive: None    Negative: Patellar grind, MCL/valgus stress (0 & 30 deg), LCL/varus stress (0 & 30 deg), anterior drawer, posterior drawer    RADIOLOGY:  I independently ordered, visualized and reviewed these  images with the patient    Results for orders placed or performed during the hospital encounter of 03/28/24   XR Knee Bilateral 1/2 Views    Narrative    EXAM: XR KNEE BILATERAL 1/2 VIEWS  LOCATION: [facility]  DATE: 3/28/2024    INDICATION: Right knee pain. Left knee pain.  COMPARISON: None.      Impression    IMPRESSION:   RIGHT KNEE: Normal joint alignment. Moderate-advanced tricompartmental degenerative arthritis, with joint space narrowing, subchondral sclerosis, and marginal osteophytes, greatest in the medial and patellofemoral compartments. No fracture or bone   lesion. No significant joint effusion.    LEFT KNEE: Normal joint alignment. Moderate-advanced degenerative arthritic changes, greatest in the lateral and patellofemoral compartments. No fracture or bone lesion. Generalized demineralization. Mild relative hypoplasia of the bones relative to the   right leg. No significant joint effusion. Severe, essentially complete atrophy of the musculature in the left thigh.         Review of external notes as documented elsewhere in note  Review of the result(s) of each unique test - bilateral knee x-rays       Disclaimer: This note consists of symbols derived from keyboarding, dictation and/or voice recognition software. As a result, there may be errors in the script that have gone undetected. Please consider this when interpreting information found in this chart.    Large Joint Injection/Arthocentesis: bilateral knee    Date/Time: 4/11/2024 2:11 PM    Performed by: Rajeev Resendiz MD  Authorized by: Rajeev Resendiz MD    Indications:  Pain and osteoarthritis  Needle Size:  25 G  Guidance: ultrasound    Approach:  Superolateral  Location:  Knee  Laterality:  Bilateral      Medications (Right):  6 mg betamethasone acet & sod phos 6 (3-3) MG/ML; 4 mL ROPivacaine 5 MG/ML  Medications (Left):  6 mg betamethasone acet & sod phos 6 (3-3) MG/ML; 4 mL ROPivacaine 5  MG/ML  Outcome:  Tolerated well, no immediate complications  Procedure discussed: discussed risks, benefits, and alternatives    Consent Given by:  Patient  Timeout: timeout called immediately prior to procedure    Prep: patient was prepped and draped in usual sterile fashion     Ultrasound images of procedure were permanently stored.     Patient reported improvement of pain after the numbing portion bilateral knee joint steorid injection.  Ultrasound guided images were permanently stored.   Aftercare instructions given to patient.  Plan to follow-up as discussed above.     Rajeev Resendiz MD [facility]

## 2024-04-11 NOTE — LETTER
4/11/2024         RE: Lolly Jaeger  [address]  [place] [address]        Dear Colleague,    Thank you for referring your patient, Lolly Jaeger, to the [facility] [place]. Please see a copy of my visit note below.    ASSESSMENT & PLAN    Lolly was seen today for pain and pain.    Diagnoses and all orders for this visit:    Primary osteoarthritis of both knees  -     Large Joint Injection/Arthocentesis: bilateral knee  -     Physical Therapy  Referral; Future    Arthralgia, unspecified joint  -     Orthopedic  Referral  -     Large Joint Injection/Arthocentesis: bilateral knee      This issue is acute on chronic and Worsening.    # Bilateral Knee Arthritis: Lolly Jaeger  was seen today for acute on chronic bilateral knee pain. Symptoms had been going on for years, worsening over the past few months. On examination there are positive findings of tenderness to palpation over the knee joint lines. Imaging findings showed bilateral knee arthritis right > left. Likely cause of patient's condition due to flare of knee osteoarthritis. Other possible conditions contributing to symptoms include flare of lupus.  Reviewed ultrasound evaluation today showing no knee effusion. Counseled patient on nature of condition and treatment options.  Previous knee joint steroid injections helped last year. Given this plan as below, follow-up 1 mon as needed.     Image Findings: bilateral knee right > left arthritis  Treatment: Activities as tolerated, home exercises given today, PT referral placed  Medications/Injections: Limited tylenol/ibuprofen for pain for 1-2 weeks, Topical Voltaren gel, bilateral knee joint steroid injections  Follow-up: In one month if symptoms do not improve, sooner if worsening  Can consider gel injection       Rajeev Resendiz MD  [facility] [place]    -----  Chief Complaint   Patient  presents with     Right Knee - Pain     Left Knee - Pain       SUBJECTIVE  Lolly Jaeger is a/an 44 year old female who is seen in consultation at the request of  Nya Segal M.D. for evaluation of bilateral knee pain. Reviewed rheumatology notes.    The patient is seen by themselves.    Visit completed with phone     Onset: Several years(s) ago. Reports insidious onset without acute precipitating event. Saw Rheumatology 3/21/24. Bilateral knee xrays perfomred 3/28/24.   Location of Pain: bilateral knee pain  Worsened by: walking, increased use  Better with: steroid injections   Treatments tried:gabapentin, steroid injections, walker   Associated symptoms: no distal numbness or tingling; denies swelling or warmth    Orthopedic/Surgical history: YES -Polio, lupus, Previously treating at  Orthopedics, chronic multiple joint pain        No family history pertinent to patient's problem today.      REVIEW OF SYSTEMS:  Review of Systems  Constitutional, HEENT, cardiovascular, pulmonary, gi and gu systems are negative, except as otherwise noted.    OBJECTIVE:  Wt 94.8 kg (209 lb)    General: healthy, alert and in no distress  HEENT: no scleral icterus or conjunctival erythema  Skin: no suspicious lesions or rash. No jaundice.  CV: distal perfusion intact    Resp: normal respiratory effort without conversational dyspnea   Psych: normal mood and affect  Gait: normal steady gait with appropriate coordination and balance    Neuro: Normal light sensory exam of bilateral lower extremities    Ortho Exam   BILATERAL KNEE  Inspection:    Normal alignment; no edema, erythema, or ecchymosis present  Palpation:    Tender about the lateral joint line and medial joint line. Remainder of bony and ligamentous landmarks are nontender.    No effusion is present    Patellofemoral crepitus is Present  Range of Motion:     00 extension to 1350 flexion  Strength:    Quadriceps 5/5, hamstrings 5/5, gastrocsoleus 5/5,  and tibialis anterior 5/5    Extensor mechanism intact  Special Tests:    Positive: None    Negative: Patellar grind, MCL/valgus stress (0 & 30 deg), LCL/varus stress (0 & 30 deg), anterior drawer, posterior drawer    RADIOLOGY:  I independently ordered, visualized and reviewed these images with the patient    Results for orders placed or performed during the hospital encounter of 03/28/24   XR Knee Bilateral 1/2 Views    Narrative    EXAM: XR KNEE BILATERAL 1/2 VIEWS  LOCATION: [facility]  DATE: 3/28/2024    INDICATION: Right knee pain. Left knee pain.  COMPARISON: None.      Impression    IMPRESSION:   RIGHT KNEE: Normal joint alignment. Moderate-advanced tricompartmental degenerative arthritis, with joint space narrowing, subchondral sclerosis, and marginal osteophytes, greatest in the medial and patellofemoral compartments. No fracture or bone   lesion. No significant joint effusion.    LEFT KNEE: Normal joint alignment. Moderate-advanced degenerative arthritic changes, greatest in the lateral and patellofemoral compartments. No fracture or bone lesion. Generalized demineralization. Mild relative hypoplasia of the bones relative to the   right leg. No significant joint effusion. Severe, essentially complete atrophy of the musculature in the left thigh.         Review of external notes as documented elsewhere in note  Review of the result(s) of each unique test - bilateral knee x-rays       Disclaimer: This note consists of symbols derived from keyboarding, dictation and/or voice recognition software. As a result, there may be errors in the script that have gone undetected. Please consider this when interpreting information found in this chart.    Large Joint Injection/Arthocentesis: bilateral knee    Date/Time: 4/11/2024 2:11 PM    Performed by: Rajeev Resendiz MD  Authorized by: Rajeev Resendiz MD    Indications:  Pain and osteoarthritis  Needle Size:  25  G  Guidance: ultrasound    Approach:  Superolateral  Location:  Knee  Laterality:  Bilateral      Medications (Right):  6 mg betamethasone acet & sod phos 6 (3-3) MG/ML; 4 mL ROPivacaine 5 MG/ML  Medications (Left):  6 mg betamethasone acet & sod phos 6 (3-3) MG/ML; 4 mL ROPivacaine 5 MG/ML  Outcome:  Tolerated well, no immediate complications  Procedure discussed: discussed risks, benefits, and alternatives    Consent Given by:  Patient  Timeout: timeout called immediately prior to procedure    Prep: patient was prepped and draped in usual sterile fashion     Ultrasound images of procedure were permanently stored.     Patient reported improvement of pain after the numbing portion bilateral knee joint steorid injection.  Ultrasound guided images were permanently stored.   Aftercare instructions given to patient.  Plan to follow-up as discussed above.     Rajeev Resendiz MD [facility]            Again, thank you for allowing me to participate in the care of your patient.        Sincerely,        Rajeev Resendiz MD

## 2024-04-19 RX ORDER — ROPIVACAINE HYDROCHLORIDE 5 MG/ML
4 INJECTION, SOLUTION EPIDURAL; INFILTRATION; PERINEURAL
Status: SHIPPED | OUTPATIENT
Start: 2024-04-11

## 2024-04-19 RX ORDER — BETAMETHASONE SODIUM PHOSPHATE AND BETAMETHASONE ACETATE 3; 3 MG/ML; MG/ML
6 INJECTION, SUSPENSION INTRA-ARTICULAR; INTRALESIONAL; INTRAMUSCULAR; SOFT TISSUE
Status: SHIPPED | OUTPATIENT
Start: 2024-04-11

## 2024-05-15 NOTE — PROGRESS NOTES
PHYSICAL THERAPY EVALUATION  Type of Visit: Evaluation    See electronic medical record for Abuse and Falls Screening details.    Subjective   Pt presents to PT with primary complaint of knee pain. Right knee is more painful, left is weak. It is very difficult to get up from chairs, use the toilet, lift her legs, and walk around. The pain is chronic for years. It improves a bit with corticosteroid injections; most recently got one in each knee on 4/11/24.  She also reports neck, back and shoulder pain - it is painful to turn my head and move in general.     History of polio with left leg weakness. At baseline she uses a 4WW for mobility and has a PCA to assist with most household tasks & self cares. She has been referred to PT before but had not gone to an appointment. Just wants the pain to get better.         Presenting condition or subjective complaint:  Knee pain - right more painful, left weakness   Date of onset: 04/11/24 (date of orders; chronic issue)    Relevant medical history:   SLE, post-polio with left leg weakness; hypothyroidism  Dates & types of surgery:      Prior diagnostic imaging/testing results:     x-ray 03/28/2024  IMPRESSION:   RIGHT KNEE: Normal joint alignment. Moderate-advanced tricompartmental degenerative arthritis, with joint space narrowing, subchondral sclerosis, and marginal osteophytes, greatest in the medial and patellofemoral compartments. No fracture or bone   lesion. No significant joint effusion.   LEFT KNEE: Normal joint alignment. Moderate-advanced degenerative arthritic changes, greatest in the lateral and patellofemoral compartments. No fracture or bone lesion. Generalized demineralization. Mild relative hypoplasia of the bones relative to the   right leg. No significant joint effusion. Severe, essentially complete atrophy of the musculature in the left thigh.     MRI left knee, 11/2023:  1. Moderate to high-grade cartilage loss, far lateral weightbearing aspect of the  "lateral compartment and far anterior weightbearing aspect of the tibial plateau. Moderate femoral and anterior tibial subchondral marrow edema; would favor that these represent adjacent areas of bone contusion. No discrete fracture line identified.  2. The anterior horn of the lateral meniscus is not well seen which may reflect degenerative change/maceration, or previous meniscectomy in this area. The body and posterior horn are intact.   3. Marked fatty atrophy of the muscles of the distal thigh and proximal leg suggesting denervation.     Prior therapy history for the same diagnosis, illness or injury:    none recently    Prior Level of Function  Transfers: Assistive equipment  Ambulation: Assistive equipment, FWW  ADL: Assistive equipment and person  IADL:  PCA assistance for driving, meal preparation & housekeeping    Living Environment  Social support:   lives alone, has PCA   Type of home:   apartment with elevator access  Stairs to enter the home:       none  Ramp:     Stairs inside the home:       none  Help at home:  PCA  Equipment owned:   wheelchair, 4WW    Employment:    none  Hobbies/Interests:  \"I'm just at home\"    Patient goals for therapy:  to decrease pain.     Pain assessment: Pain present  Location: both knees, neck, shoulders, back /Ratin/10     Objective   KNEE EVALUATION  PAIN: Pain Level at Rest: 7/10  Pain Level with Use: 9/10  Pain Location: knees  Pain Frequency: constant  Pain is Worst: daytime  Pain is Exacerbated By: walking, lifting leg & turning in bed, going to the bathroom  Pain is Relieved By: CSI. Not much else.   Pain Progression: improved, since injections    INTEGUMENTARY (edema, incisions):  general edema about B knees. No pitting edema in lower extremities.     POSTURE: Standing Posture: Rounded shoulders, Sway back, heavy forward lean on 4WW    GAIT:  Weightbearing Status:  FWB  Assistive Device(s): Walker (four wheeled)  Gait Deviations: impaired use of LLE - hip & knee " extension thrust during stance, toe out, heavy forward trunk lean; impaired WB & push-off on LLE     BALANCE/PROPRIOCEPTION:  unable to stand without 4WW    Heavy use of hands & slow to transfer out of chair    ROM:   (Degrees) Left AROM Left PROM  Right AROM Right PROM   Knee Flexion  NT  65   very painful   Knee Extension  Lacking full extension, painful  Lacking 5 deg  very painful   Pain:   End feel: empty/guarded    STRENGTH:   Pain: - none + mild ++ moderate +++ severe  Strength Scale: 0-5/5 Left Right   Knee Flexion 2+ 3+   Knee Extension 2+ 3+   Quad Set Unable - potential language barrier instructing maneuver Unable - potential language barrier instructing maneuver   Hip Flexion 2+ 3+   Ankle Plantarflexion 2 3+       PALPATION:  ++ R knee joint line      Assessment & Plan   CLINICAL IMPRESSIONS  Medical Diagnosis: Primary osteoarthritis of both knees    Treatment Diagnosis: bilateral knee pain; lower extremity weakness   Impression/Assessment: Patient is a 44 year old female with bilateral knee, neck, shoulder, back complaints.  The following significant findings have been identified: Pain, Decreased strength, Impaired balance, Inflammation, Impaired gait, Impaired muscle performance, Decreased activity tolerance, and Impaired posture. These impairments interfere with their ability to perform self care tasks, household chores, household mobility, and community mobility as compared to previous level of function.     Clinical Decision Making (Complexity):  Clinical Presentation: Stable/Uncomplicated  Clinical Presentation Rationale: based on medical and personal factors listed in PT evaluation  Clinical Decision Making (Complexity): Low complexity    PLAN OF CARE  Treatment Interventions:  Interventions: Gait Training, Manual Therapy, Neuromuscular Re-education, Therapeutic Activity, Therapeutic Exercise, Community/Work Reintegration, Wheelchair Management/Training    Long Term Goals     PT Goal 1  Goal  Identifier: Transfers  Goal Description: Pt will be able to transfer out of chairs or off toilets with <5/10 knee pain  Rationale: to maximize safety and independence with self cares  Target Date: 07/11/24  PT Goal 2  Goal Identifier: Ambulation  Goal Description: Pt will be able to ambulate at least 50' with 4WW and <5/10 knee pain  Target Date: 07/25/24      Frequency of Treatment: 1x/week tapering to 2x/month  Duration of Treatment: up to 3 months    Recommended Referrals to Other Professionals:  pending progress, OT or home evaluation?   Education Assessment:   Learner/Method: Patient    Risks and benefits of evaluation/treatment have been explained.   Patient/Family/caregiver agrees with Plan of Care.     Evaluation Time:     PT Eval, Low Complexity Minutes (95927): 25   Present: Yes over the phone. Language: Bel[place]an, ID Number/Identifier: [id number]     Signing Clinician: Akila Keenan PT      [facility]                                                                                   OUTPATIENT PHYSICAL THERAPY      PLAN OF TREATMENT FOR OUTPATIENT REHABILITATION   Patient's Last Name, First Name, Lolly Cee YOB: 1980   Provider's Name   [facility]   Medical Record No.  [id number]     Onset Date: 04/11/24 (date of orders; chronic issue)  Start of Care Date: 05/16/24     Medical Diagnosis:  Primary osteoarthritis of both knees      PT Treatment Diagnosis:  bilateral knee pain; lower extremity weakness Plan of Treatment  Frequency/Duration: 1x/week tapering to 2x/month/ up to 3 months    Certification date from 05/16/24 to 08/13/24         See note for plan of treatment details and functional goals     Akila Keenan PT                         I CERTIFY THE NEED FOR THESE SERVICES FURNISHED UNDER        THIS PLAN OF TREATMENT AND WHILE UNDER MY CARE     (Physician attestation of this document indicates review and  certification of the therapy plan).              Referring Provider:  Rajeev Resendiz    Initial Assessment  See Epic Evaluation- Start of Care Date: 05/16/24

## 2024-05-16 ENCOUNTER — THERAPY VISIT (OUTPATIENT)
Dept: PHYSICAL THERAPY | Facility: CLINIC | Age: 44
End: 2024-05-16
Attending: FAMILY MEDICINE
Payer: COMMERCIAL

## 2024-05-16 DIAGNOSIS — M17.0 PRIMARY OSTEOARTHRITIS OF BOTH KNEES: ICD-10-CM

## 2024-05-16 PROCEDURE — 97110 THERAPEUTIC EXERCISES: CPT | Mod: GP

## 2024-05-16 PROCEDURE — 97161 PT EVAL LOW COMPLEX 20 MIN: CPT | Mod: GP

## 2024-05-29 ENCOUNTER — OFFICE VISIT (OUTPATIENT)
Dept: RHEUMATOLOGY | Facility: CLINIC | Age: 44
End: 2024-05-29
Attending: STUDENT IN AN ORGANIZED HEALTH CARE EDUCATION/TRAINING PROGRAM
Payer: COMMERCIAL

## 2024-05-29 VITALS
HEART RATE: 63 BPM | SYSTOLIC BLOOD PRESSURE: 115 MMHG | DIASTOLIC BLOOD PRESSURE: 74 MMHG | OXYGEN SATURATION: 98 % | WEIGHT: 210 LBS

## 2024-05-29 DIAGNOSIS — M25.50 ARTHRALGIA, UNSPECIFIED JOINT: ICD-10-CM

## 2024-05-29 DIAGNOSIS — R76.8 POSITIVE ANA (ANTINUCLEAR ANTIBODY): Primary | ICD-10-CM

## 2024-05-29 PROCEDURE — 99214 OFFICE O/P EST MOD 30 MIN: CPT | Performed by: STUDENT IN AN ORGANIZED HEALTH CARE EDUCATION/TRAINING PROGRAM

## 2024-05-29 PROCEDURE — G0463 HOSPITAL OUTPT CLINIC VISIT: HCPCS | Performed by: STUDENT IN AN ORGANIZED HEALTH CARE EDUCATION/TRAINING PROGRAM

## 2024-05-29 ASSESSMENT — PAIN SCALES - GENERAL: PAINLEVEL: EXTREME PAIN (8)

## 2024-05-29 ASSESSMENT — ENCOUNTER SYMPTOMS
FEVER: 0
HEMATURIA: 0
BLOOD IN STOOL: 0
WEIGHT LOSS: 0

## 2024-05-29 NOTE — LETTER
5/29/2024       RE: Lolly Jageer  [address]  [place] [facility] [place] [address]     Dear Colleague,    Thank you for referring your patient, Lolly Jaeger, to the [facility] RHEUMATOLOGY at [facility]. Please see a copy of my visit note below.    RHEUMATOLOGY OUTPATIENT CLINIC NOTE     Referring Provider: Nya Segal MD    Subjective    Visit date May 29, 2024    Lolly Jaeger is a 44 year old female who presents today for follow up.    Since her last visit,    She was evaluated by her colleagues in sports clinic, ultrasound evaluation revealed no knee effusion targetable for aspiration.  She was referred to physical therapy  Workup showed   ESR 10, CRP undetectable  ANGELIC positive, 1: 320 speckled pattern.    dsDNA, CINDY panel: Negative  Complement C3/C4: Normal  Urinalysis without cells or proteins  X-ray knees with significant degenerative changes without evidence of significant joint effusion.     Today, she shares the following:     She feels injection in her knees helped her pain. She denies arthritis in the hands, elbows, feet.   She denies oral ulcers. She has thinning in her hair but no alopecia.  She denies dryness in her eyes and mouth  She had darkening of the skin with hydroxychloroquine     Review of Systems   Constitutional:  Negative for fever and weight loss.   Cardiovascular:  Negative for chest pain.   Gastrointestinal:  Negative for blood in stool.   Genitourinary:  Negative for hematuria.   Musculoskeletal:  Positive for joint pain.   Skin:  Negative for rash.     Current Medications   Currently off hydroxychloroquine     Past Medical history   Past Medical History:   Diagnosis Date    Diabetes mellitus (H)     metformin    Pain in left foot 7/2014 7/19/2014 cuneiform fracture    Polio 1982    left leg weakness    SLE (systemic lupus erythematosus related syndrome) (H)      Objective  /74 (BP  Location: Left arm, Patient Position: Sitting, Cuff Size: Adult Large)   Pulse 63   Wt 95.3 kg (210 lb)   SpO2 98%     PHYSICAL EXAMINATION  Physical Exam  HENT:      Mouth/Throat:      Mouth: Mucous membranes are moist.   Eyes:      Conjunctiva/sclera: Conjunctivae normal.   Cardiovascular:      Heart sounds: Normal heart sounds.   Pulmonary:      Effort: Pulmonary effort is normal.      Breath sounds: Normal breath sounds.   Musculoskeletal:         General: Tenderness present. No swelling.      Comments: No synovitis in the hands, elbows, feet.   Tenderness in both knee     Skin:     Findings: No rash.   Neurological:      Mental Status: She is alert.      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     She has degenerative arthritis in her knees, there is no evidence of joint effusion on ultrasound and her knee pain improved with injections, She denies symptoms suggestive of active lupus (inflammatory arthritis, oral ulcers, sicca symptoms, rash, pleuritic chest pain). Labs show positive ANGELIC but otherwise negative CINDY, dsDNA, CINDY panel with normal complements and urinalysis without cells or proteins.     She has positive ANGELIC which is nonspecific, she has suspected undifferentiated connective tissue disease based on her history but there is no evidence of active systemic lupus and there is no evidence of major organ involvement at the current time.    We discussed either watching her symptoms conservatively or using hydroxychloroquine as she had  benefit previously from it and it can be used in patients with positive ANGELIC and suspected lupus. She opted to follow her symptoms without medications for the time being.     Plan:  1- Continue to watch her symptoms   2- Can consider re-introduction of hydroxychloroquine if she develops features suggestive of lupus   3- Follow up in 6 months with repeat labs. If she continues to do well at the time then can follow as needed afterwards       31 minutes spent by me on the date of the encounter doing chart review, history and exam, documentation and further activities per the note      Return in about 6 months (around 11/29/2024) for Follow up.    Nya Segal MD  [facility] RHEUMATOLOGY

## 2024-05-29 NOTE — PROGRESS NOTES
RHEUMATOLOGY OUTPATIENT CLINIC NOTE     Referring Provider: Nya Segal MD    Subjective     Visit date May 29, 2024    Lolly Jaeger is a 44 year old female who presents today for follow up.    Since her last visit,    She was evaluated by her colleagues in sports clinic, ultrasound evaluation revealed no knee effusion targetable for aspiration.  She was referred to physical therapy  Workup showed   ESR 10, CRP undetectable  ANGELIC positive, 1: 320 speckled pattern.    dsDNA, CINDY panel: Negative  Complement C3/C4: Normal  Urinalysis without cells or proteins  X-ray knees with significant degenerative changes without evidence of significant joint effusion.     Today, she shares the following:     She feels injection in her knees helped her pain. She denies arthritis in the hands, elbows, feet.   She denies oral ulcers. She has thinning in her hair but no alopecia.  She denies dryness in her eyes and mouth  She had darkening of the skin with hydroxychloroquine     Review of Systems   Constitutional:  Negative for fever and weight loss.   Cardiovascular:  Negative for chest pain.   Gastrointestinal:  Negative for blood in stool.   Genitourinary:  Negative for hematuria.   Musculoskeletal:  Positive for joint pain.   Skin:  Negative for rash.     Current Medications   Currently off hydroxychloroquine     Past Medical history   Past Medical History:   Diagnosis Date    Diabetes mellitus (H)     metformin    Pain in left foot 7/2014 7/19/2014 cuneiform fracture    Polio 1982    left leg weakness    SLE (systemic lupus erythematosus related syndrome) (H)      Objective   /74 (BP Location: Left arm, Patient Position: Sitting, Cuff Size: Adult Large)   Pulse 63   Wt 95.3 kg (210 lb)   SpO2 98%     PHYSICAL EXAMINATION  Physical Exam  HENT:      Mouth/Throat:      Mouth: Mucous membranes are moist.   Eyes:      Conjunctiva/sclera: Conjunctivae normal.   Cardiovascular:      Heart sounds: Normal heart  sounds.   Pulmonary:      Effort: Pulmonary effort is normal.      Breath sounds: Normal breath sounds.   Musculoskeletal:         General: Tenderness present. No swelling.      Comments: No synovitis in the hands, elbows, feet.   Tenderness in both knee     Skin:     Findings: No rash.   Neurological:      Mental Status: She is alert.      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     She has degenerative arthritis in her knees, there is no evidence of joint effusion on ultrasound and her knee pain improved with injections, She denies symptoms suggestive of active lupus (inflammatory arthritis, oral ulcers, sicca symptoms, rash, pleuritic chest pain). Labs show positive ANGELIC but otherwise negative CINDY, dsDNA, CINDY panel with normal complements and urinalysis without cells or proteins.     She has positive ANGELIC which is nonspecific, she has suspected undifferentiated connective tissue disease based on her history but there is no evidence of active systemic lupus and there is no evidence of major organ involvement at the current time.    We discussed either watching her symptoms conservatively or using hydroxychloroquine as she had benefit previously from it and it can be used in patients with positive ANGELIC and suspected lupus. She opted to follow her symptoms without medications for the time being.     Plan:  1- Continue to watch her symptoms   2- Can consider re-introduction of hydroxychloroquine if she develops features suggestive of lupus   3- Follow  up in 6 months with repeat labs. If she continues to do well at the time then can follow as needed afterwards       31 minutes spent by me on the date of the encounter doing chart review, history and exam, documentation and further activities per the note      Return in about 6 months (around 11/29/2024) for Follow up.    Nya Segal MD  [facility] RHEUMATOLOGY

## 2024-05-29 NOTE — NURSING NOTE
Chief Complaint   Patient presents with    RECHECK     /74 (BP Location: Left arm, Patient Position: Sitting, Cuff Size: Adult Large)   Pulse 63   Wt 95.3 kg (210 lb)   SpO2 98%

## 2024-11-26 ENCOUNTER — DOCUMENTATION ONLY (OUTPATIENT)
Dept: RHEUMATOLOGY | Facility: CLINIC | Age: 44
End: 2024-11-26
Payer: COMMERCIAL

## 2024-11-26 NOTE — PROGRESS NOTES
Lolly was no-show to the Rheumatology appointment today.     Nya Segal MD  Rheumatology attending